# Patient Record
Sex: FEMALE | Race: BLACK OR AFRICAN AMERICAN | NOT HISPANIC OR LATINO | Employment: OTHER | URBAN - METROPOLITAN AREA
[De-identification: names, ages, dates, MRNs, and addresses within clinical notes are randomized per-mention and may not be internally consistent; named-entity substitution may affect disease eponyms.]

---

## 2023-03-23 DIAGNOSIS — E11.9 TYPE 2 DIABETES MELLITUS WITHOUT COMPLICATION, WITHOUT LONG-TERM CURRENT USE OF INSULIN (MULTI): Primary | ICD-10-CM

## 2023-03-23 RX ORDER — METFORMIN HYDROCHLORIDE 500 MG/1
TABLET ORAL
Qty: 360 TABLET | Refills: 3 | Status: SHIPPED | OUTPATIENT
Start: 2023-03-23 | End: 2024-03-04

## 2023-03-25 DIAGNOSIS — E11.9 TYPE 2 DIABETES MELLITUS WITHOUT COMPLICATIONS (MULTI): ICD-10-CM

## 2023-03-27 RX ORDER — GLIMEPIRIDE 4 MG/1
TABLET ORAL
Qty: 30 TABLET | Refills: 1 | Status: SHIPPED | OUTPATIENT
Start: 2023-03-27 | End: 2023-04-19 | Stop reason: SDUPTHER

## 2023-04-14 DIAGNOSIS — E11.9 TYPE 2 DIABETES MELLITUS WITHOUT COMPLICATION, WITHOUT LONG-TERM CURRENT USE OF INSULIN (MULTI): Primary | ICD-10-CM

## 2023-04-17 RX ORDER — BLOOD SUGAR DIAGNOSTIC
STRIP MISCELLANEOUS
Qty: 150 STRIP | Refills: 0 | Status: SHIPPED | OUTPATIENT
Start: 2023-04-17 | End: 2023-04-19 | Stop reason: SDUPTHER

## 2023-04-19 ENCOUNTER — OFFICE VISIT (OUTPATIENT)
Dept: PRIMARY CARE | Facility: CLINIC | Age: 63
End: 2023-04-19
Payer: COMMERCIAL

## 2023-04-19 VITALS
WEIGHT: 168.8 LBS | HEART RATE: 82 BPM | RESPIRATION RATE: 16 BRPM | BODY MASS INDEX: 25.58 KG/M2 | OXYGEN SATURATION: 97 % | DIASTOLIC BLOOD PRESSURE: 82 MMHG | HEIGHT: 68 IN | SYSTOLIC BLOOD PRESSURE: 130 MMHG

## 2023-04-19 DIAGNOSIS — E78.5 HYPERLIPIDEMIA, UNSPECIFIED HYPERLIPIDEMIA TYPE: ICD-10-CM

## 2023-04-19 DIAGNOSIS — E11.9 TYPE 2 DIABETES MELLITUS WITHOUT COMPLICATION, WITHOUT LONG-TERM CURRENT USE OF INSULIN (MULTI): ICD-10-CM

## 2023-04-19 DIAGNOSIS — Z86.39 HISTORY OF GRAVES' DISEASE: ICD-10-CM

## 2023-04-19 DIAGNOSIS — E11.9 TYPE 2 DIABETES MELLITUS WITHOUT COMPLICATIONS (MULTI): ICD-10-CM

## 2023-04-19 DIAGNOSIS — I10 PRIMARY HYPERTENSION: Primary | ICD-10-CM

## 2023-04-19 DIAGNOSIS — K51.80 OTHER ULCERATIVE COLITIS WITHOUT COMPLICATION (MULTI): ICD-10-CM

## 2023-04-19 PROBLEM — R14.0 ABDOMINAL BLOATING: Status: ACTIVE | Noted: 2023-04-19

## 2023-04-19 PROBLEM — H25.10 AGE-RELATED NUCLEAR CATARACT: Status: ACTIVE | Noted: 2023-04-19

## 2023-04-19 PROBLEM — H40.9 GLAUCOMA: Status: ACTIVE | Noted: 2023-04-19

## 2023-04-19 PROBLEM — N64.4 BREAST PAIN, RIGHT: Status: ACTIVE | Noted: 2023-04-19

## 2023-04-19 PROBLEM — K21.9 GERD WITHOUT ESOPHAGITIS: Status: ACTIVE | Noted: 2023-04-19

## 2023-04-19 PROBLEM — L91.8 SKIN TAGS, MULTIPLE ACQUIRED: Status: ACTIVE | Noted: 2023-04-19

## 2023-04-19 PROBLEM — K51.90 ULCERATIVE COLITIS (MULTI): Status: ACTIVE | Noted: 2023-04-19

## 2023-04-19 LAB
ESTIMATED AVERAGE GLUCOSE FOR HBA1C: 163 MG/DL
HEMOGLOBIN A1C/HEMOGLOBIN TOTAL IN BLOOD: 7.3 %

## 2023-04-19 PROCEDURE — 3051F HG A1C>EQUAL 7.0%<8.0%: CPT | Performed by: SPECIALIST

## 2023-04-19 PROCEDURE — 3075F SYST BP GE 130 - 139MM HG: CPT | Performed by: SPECIALIST

## 2023-04-19 PROCEDURE — 83036 HEMOGLOBIN GLYCOSYLATED A1C: CPT

## 2023-04-19 PROCEDURE — 99214 OFFICE O/P EST MOD 30 MIN: CPT | Performed by: SPECIALIST

## 2023-04-19 PROCEDURE — 1036F TOBACCO NON-USER: CPT | Performed by: SPECIALIST

## 2023-04-19 PROCEDURE — 36415 COLL VENOUS BLD VENIPUNCTURE: CPT | Performed by: SPECIALIST

## 2023-04-19 PROCEDURE — 3079F DIAST BP 80-89 MM HG: CPT | Performed by: SPECIALIST

## 2023-04-19 PROCEDURE — 82043 UR ALBUMIN QUANTITATIVE: CPT

## 2023-04-19 PROCEDURE — 82570 ASSAY OF URINE CREATININE: CPT

## 2023-04-19 PROCEDURE — 4010F ACE/ARB THERAPY RXD/TAKEN: CPT | Performed by: SPECIALIST

## 2023-04-19 PROCEDURE — 80053 COMPREHEN METABOLIC PANEL: CPT

## 2023-04-19 RX ORDER — LINAGLIPTIN 5 MG/1
5 TABLET, FILM COATED ORAL DAILY
Qty: 90 TABLET | Refills: 1 | Status: SHIPPED | OUTPATIENT
Start: 2023-04-19 | End: 2023-10-06

## 2023-04-19 RX ORDER — BLOOD SUGAR DIAGNOSTIC
STRIP MISCELLANEOUS
Qty: 150 STRIP | Refills: 0 | Status: SHIPPED | OUTPATIENT
Start: 2023-04-19 | End: 2024-01-12 | Stop reason: WASHOUT

## 2023-04-19 RX ORDER — LINAGLIPTIN 5 MG/1
TABLET, FILM COATED ORAL
COMMUNITY
Start: 2022-11-30 | End: 2023-04-19 | Stop reason: SDUPTHER

## 2023-04-19 RX ORDER — ATORVASTATIN CALCIUM 40 MG/1
1 TABLET, FILM COATED ORAL DAILY
COMMUNITY
Start: 2021-02-10 | End: 2023-04-19 | Stop reason: SDUPTHER

## 2023-04-19 RX ORDER — ATORVASTATIN CALCIUM 40 MG/1
40 TABLET, FILM COATED ORAL DAILY
Qty: 90 TABLET | Refills: 1 | Status: SHIPPED | OUTPATIENT
Start: 2023-04-19 | End: 2024-04-03

## 2023-04-19 RX ORDER — GLIMEPIRIDE 4 MG/1
TABLET ORAL
Qty: 90 TABLET | Refills: 1 | Status: SHIPPED | OUTPATIENT
Start: 2023-04-19 | End: 2024-03-12

## 2023-04-19 RX ORDER — LANCETS 33 GAUGE
1 EACH MISCELLANEOUS 2 TIMES DAILY
Qty: 100 EACH | Refills: 1 | Status: SHIPPED | OUTPATIENT
Start: 2023-04-19 | End: 2023-06-20

## 2023-04-19 RX ORDER — LISINOPRIL 20 MG/1
20 TABLET ORAL DAILY
Qty: 90 TABLET | Refills: 0 | Status: SHIPPED | OUTPATIENT
Start: 2023-04-19 | End: 2023-06-20

## 2023-04-19 RX ORDER — LANCETS 33 GAUGE
EACH MISCELLANEOUS
COMMUNITY
Start: 2023-01-11 | End: 2023-04-19 | Stop reason: SDUPTHER

## 2023-04-19 RX ORDER — LISINOPRIL 20 MG/1
1 TABLET ORAL DAILY
COMMUNITY
Start: 2020-03-18 | End: 2023-04-19 | Stop reason: SDUPTHER

## 2023-04-19 RX ORDER — BRIMONIDINE TARTRATE 2 MG/ML
1 SOLUTION/ DROPS OPHTHALMIC EVERY 12 HOURS
COMMUNITY

## 2023-04-19 RX ORDER — OMEPRAZOLE 40 MG/1
1 CAPSULE, DELAYED RELEASE ORAL DAILY
COMMUNITY
Start: 2022-09-14

## 2023-04-19 RX ORDER — TRAVOPROST OPHTHALMIC SOLUTION 0.04 MG/ML
1 SOLUTION OPHTHALMIC NIGHTLY
COMMUNITY

## 2023-04-19 ASSESSMENT — ENCOUNTER SYMPTOMS
LOSS OF SENSATION IN FEET: 0
OCCASIONAL FEELINGS OF UNSTEADINESS: 0
DEPRESSION: 0

## 2023-04-19 NOTE — ASSESSMENT & PLAN NOTE
-sees optho regularly  -Labs ordered HBA1C CMP Urine albumin  -Last A1C was 8.5 (was 8.5) and Trandjenta was added, stillalso on metformin 1000 mg bid and glimepiride 4 mg daily  -On ACEI

## 2023-04-20 LAB
ALANINE AMINOTRANSFERASE (SGPT) (U/L) IN SER/PLAS: 22 U/L (ref 7–45)
ALBUMIN (G/DL) IN SER/PLAS: 4.5 G/DL (ref 3.4–5)
ALBUMIN (MG/L) IN URINE: <7 MG/L
ALBUMIN/CREATININE (UG/MG) IN URINE: NORMAL UG/MG CRT (ref 0–30)
ALKALINE PHOSPHATASE (U/L) IN SER/PLAS: 77 U/L (ref 33–136)
ANION GAP IN SER/PLAS: 13 MMOL/L (ref 10–20)
ASPARTATE AMINOTRANSFERASE (SGOT) (U/L) IN SER/PLAS: 25 U/L (ref 9–39)
BILIRUBIN TOTAL (MG/DL) IN SER/PLAS: 0.6 MG/DL (ref 0–1.2)
CALCIUM (MG/DL) IN SER/PLAS: 10 MG/DL (ref 8.6–10.6)
CARBON DIOXIDE, TOTAL (MMOL/L) IN SER/PLAS: 28 MMOL/L (ref 21–32)
CHLORIDE (MMOL/L) IN SER/PLAS: 105 MMOL/L (ref 98–107)
CREATININE (MG/DL) IN SER/PLAS: 0.8 MG/DL (ref 0.5–1.05)
CREATININE (MG/DL) IN URINE: 90.9 MG/DL (ref 20–320)
GFR FEMALE: 82 ML/MIN/1.73M2
GLUCOSE (MG/DL) IN SER/PLAS: 89 MG/DL (ref 74–99)
POTASSIUM (MMOL/L) IN SER/PLAS: 4 MMOL/L (ref 3.5–5.3)
PROTEIN TOTAL: 6.9 G/DL (ref 6.4–8.2)
SODIUM (MMOL/L) IN SER/PLAS: 142 MMOL/L (ref 136–145)
UREA NITROGEN (MG/DL) IN SER/PLAS: 12 MG/DL (ref 6–23)

## 2023-04-25 ENCOUNTER — TELEPHONE (OUTPATIENT)
Dept: PRIMARY CARE | Facility: CLINIC | Age: 63
End: 2023-04-25
Payer: COMMERCIAL

## 2023-04-25 DIAGNOSIS — E11.9 TYPE 2 DIABETES MELLITUS WITHOUT COMPLICATION, WITHOUT LONG-TERM CURRENT USE OF INSULIN (MULTI): Primary | ICD-10-CM

## 2023-04-25 RX ORDER — GLIMEPIRIDE 1 MG/1
1 TABLET ORAL
Qty: 90 TABLET | Refills: 0 | Status: SHIPPED | OUTPATIENT
Start: 2023-04-25 | End: 2023-07-13 | Stop reason: SINTOL

## 2023-04-25 RX ORDER — GLIMEPIRIDE 1 MG/1
1 TABLET ORAL
Qty: 90 TABLET | Refills: 0 | Status: SHIPPED | OUTPATIENT
Start: 2023-04-25 | End: 2023-04-25 | Stop reason: SDUPTHER

## 2023-04-27 ENCOUNTER — TELEMEDICINE (OUTPATIENT)
Dept: PHARMACY | Facility: HOSPITAL | Age: 63
End: 2023-04-27
Payer: COMMERCIAL

## 2023-04-27 DIAGNOSIS — E11.9 TYPE 2 DIABETES MELLITUS WITHOUT COMPLICATION, WITHOUT LONG-TERM CURRENT USE OF INSULIN (MULTI): ICD-10-CM

## 2023-04-27 RX ORDER — BLOOD-GLUCOSE SENSOR
EACH MISCELLANEOUS
Qty: 2 EACH | Refills: 11 | Status: SHIPPED | OUTPATIENT
Start: 2023-04-27 | End: 2023-07-13 | Stop reason: SDUPTHER

## 2023-04-27 NOTE — PROGRESS NOTES
Subjective     Patient ID: Trevon Vyas is a 63 y.o. female who presents for Diabetes.    Patient referred for CGM- will complete DM review at follow-up.     Allergies   Allergen Reactions    Sulfur Rash       Objective       Lab Review  Lab Results   Component Value Date    BILITOT 0.6 04/19/2023    CALCIUM 10.0 04/19/2023    CO2 28 04/19/2023     04/19/2023    CREATININE 0.80 04/19/2023    GLUCOSE 89 04/19/2023    ALKPHOS 77 04/19/2023    K 4.0 04/19/2023    PROT 6.9 04/19/2023     04/19/2023    AST 25 04/19/2023    ALT 22 04/19/2023    BUN 12 04/19/2023    ANIONGAP 13 04/19/2023    ALBUMIN 4.5 04/19/2023    GFRF 82 04/19/2023     Lab Results   Component Value Date    TRIG 130 10/14/2022    CHOL 119 10/14/2022    HDL 35.2 (A) 10/14/2022     Lab Results   Component Value Date    HGBA1C 7.3 (A) 04/19/2023     The ASCVD Risk score (Sanjiv MOSHER, et al., 2019) failed to calculate for the following reasons:    The valid total cholesterol range is 130 to 320 mg/dL      Assessment/Plan     Problem List Items Addressed This Visit          Endocrine/Metabolic    Diabetes mellitus (CMS/HCC)     Patient has compatible phone for Freestyle Carlita 3. ~$40/month through her insurance. Will send scripts and complete DM assessment at follow-up visit.               Type 2 diabetes mellitus, is not at goal. Goal ~6.5%    Follow up: I recommend diabetes care be 1 weeks.    Tsering He PharmD [facility]  Clinical Pharmacist

## 2023-04-27 NOTE — ASSESSMENT & PLAN NOTE
Patient has compatible phone for Freestyle Carlita 3. ~$40/month through her insurance. Will send scripts and complete DM assessment at follow-up visit.

## 2023-05-18 ENCOUNTER — TELEMEDICINE (OUTPATIENT)
Dept: PHARMACY | Facility: HOSPITAL | Age: 63
End: 2023-05-18
Payer: COMMERCIAL

## 2023-05-18 DIAGNOSIS — E11.9 TYPE 2 DIABETES MELLITUS WITHOUT COMPLICATION, WITHOUT LONG-TERM CURRENT USE OF INSULIN (MULTI): ICD-10-CM

## 2023-05-18 RX ORDER — ASPIRIN 81 MG/1
81 TABLET ORAL DAILY
COMMUNITY

## 2023-05-18 RX ORDER — MESALAMINE 1.2 G/1
4 TABLET, DELAYED RELEASE ORAL
COMMUNITY
End: 2023-11-16

## 2023-05-18 RX ORDER — LANOLIN ALCOHOL/MO/W.PET/CERES
100 CREAM (GRAM) TOPICAL DAILY
COMMUNITY

## 2023-05-18 NOTE — PROGRESS NOTES
Subjective     Patient ID: Trevon Vyas is a 63 y.o. female who presents for Diabetes.    Diabetes  She presents for her initial diabetic visit. She has type 2 diabetes mellitus. Her disease course has been improving. She is compliant with treatment all of the time. She is following a generally unhealthy and diabetic diet. She participates in exercise intermittently. Eye exam is current.     Patient reports since starting additional tablet of glimepiride, has been going low (69 mg/dL) after lunch and high after dinner.     Allergies   Allergen Reactions    Sulfur Rash       Objective     Current DM Pharmacotherapy:   Metformin 500 mg tablet - 2 tablets with lunch and 2 tablets with dinner   Glimepiride 4 mg - 1 tablet with lunch  Glimepiride 1 mg - 1 tablet with lunch   Tradjenta 5 mg - 1 tablet with dinner     SECONDARY PREVENTION  - Statin? Yes  - ACE-I/ARB? Yes  - Aspirin? Yes    Current monitoring regimen:   Patient is using: continuous glucose monitor    SMBG Readings: 7- day average: 148 mg/dL    Any episodes of hypoglycemia? Yes  Hypoglycemia awareness? Yes    There were no vitals taken for this visit.    Pertinent PMH Review:  - PMH of Pancreatitis: No  - PMH/FH of Medullary Thyroid Cancer: No  - PMH of Retinopathy: Glaucoma  - PMH of Urinary Tract Infections: No    Lab Review  Lab Results   Component Value Date    BILITOT 0.6 04/19/2023    CALCIUM 10.0 04/19/2023    CO2 28 04/19/2023     04/19/2023    CREATININE 0.80 04/19/2023    GLUCOSE 89 04/19/2023    ALKPHOS 77 04/19/2023    K 4.0 04/19/2023    PROT 6.9 04/19/2023     04/19/2023    AST 25 04/19/2023    ALT 22 04/19/2023    BUN 12 04/19/2023    ANIONGAP 13 04/19/2023    ALBUMIN 4.5 04/19/2023    GFRF 82 04/19/2023     Lab Results   Component Value Date    TRIG 130 10/14/2022    CHOL 119 10/14/2022    HDL 35.2 (A) 10/14/2022     Lab Results   Component Value Date    HGBA1C 7.3 (A) 04/19/2023     The ASCVD Risk score (Sanjiv DK, et al., 2019)  failed to calculate for the following reasons:    The valid total cholesterol range is 130 to 320 mg/dL      Assessment/Plan     Problem List Items Addressed This Visit          Endocrine/Metabolic    Diabetes mellitus (CMS/[facility])     CHANGE the way you take your Glimepiride:   Take 4 mg with lunch and 1 mg with dinner   CONTINUE:   Metformin 500 mg - 4 tablets daily   Tradjenta 5 mg - 1 tablet daily             Type 2 diabetes mellitus, is not at goal. A1C elevated, goal 6.5-6.9%    Follow up: I recommend diabetes care be 4 weeks.    Tsering He PharmD [facility]  Clinical Pharmacist     Continue all meds under the continuation of care with the referring provider and clinical pharmacy team

## 2023-05-18 NOTE — ASSESSMENT & PLAN NOTE
CHANGE the way you take your Glimepiride:   Take 4 mg with lunch and 1 mg with dinner   CONTINUE:   Metformin 500 mg - 4 tablets daily   Tradjenta 5 mg - 1 tablet daily

## 2023-06-15 ENCOUNTER — TELEMEDICINE (OUTPATIENT)
Dept: PHARMACY | Facility: HOSPITAL | Age: 63
End: 2023-06-15
Payer: COMMERCIAL

## 2023-06-15 DIAGNOSIS — E11.9 TYPE 2 DIABETES MELLITUS WITHOUT COMPLICATION, WITHOUT LONG-TERM CURRENT USE OF INSULIN (MULTI): Primary | ICD-10-CM

## 2023-06-15 ASSESSMENT — ENCOUNTER SYMPTOMS
TREMORS: 1
HUNGER: 1
DIABETIC ASSOCIATED SYMPTOMS: 0
SWEATS: 1

## 2023-06-15 NOTE — PROGRESS NOTES
[facility] Pharmacy Consult  Trevon Vyas is a 63 y.o. female was referred to Clinical Pharmacy Team for a Pharmacy consult.  The patient was referred for their Diabetes.    Referring Provider: Brianna Woods DO    Subjective   Allergies   Allergen Reactions    Sulfur Rash       [facility] ([facility] ) - [place], [place] - [address]  [address]  [address]  [facility] [address]  Phone: [phone] Fax: [phone]    [id number] - [place], [place] - [address] AT [facility]  [address]  [place] [address]  Phone: [phone] Fax: [phone]      Diabetes  She has type 2 diabetes mellitus. Her disease course has been improving. Hypoglycemia symptoms include hunger, sweats and tremors. There are no diabetic associated symptoms. Symptoms are stable. Risk factors for coronary artery disease include diabetes mellitus, dyslipidemia and hypertension. Current diabetic treatment includes oral agent (triple therapy). She is compliant with treatment all of the time. Her home blood glucose trend is decreasing steadily.     Pt reported the following CGM data:  CGM: Freestyle onelia 3,      mg/dL range    30-day data:  Time above range: 34%  Time within range: 65%  Time under range: 1%    14-day data:  Time above range: 38%  Time within range: 62%  Time under range: 0%    Average: 168mg/dL    Pt reported 2 episodes of hypoglycemia in the last month. Reported readings of 69 mg/dL and 53 mg/dL, respectively. Pt reported this started with the addition of her 1mg glimeperide tablet. Before starting the additional 1mg, she did not have any hypoglycemia episodes. Went through some cost with the patient, Jardiance $57 monthly and Rybelsus $90 monthly per insurance. Actos $8 monthly. Pt willing to give [facility] PAP a try and see if she qualify. Pt unwilling to try injections.    Review of Systems   Neurological:  Positive for tremors.       Objective     There were no  vitals taken for this visit.     LAB  Lab Results   Component Value Date    BILITOT 0.6 04/19/2023    CALCIUM 10.0 04/19/2023    CO2 28 04/19/2023     04/19/2023    CREATININE 0.80 04/19/2023    GLUCOSE 89 04/19/2023    ALKPHOS 77 04/19/2023    K 4.0 04/19/2023    PROT 6.9 04/19/2023     04/19/2023    AST 25 04/19/2023    ALT 22 04/19/2023    BUN 12 04/19/2023    ANIONGAP 13 04/19/2023    ALBUMIN 4.5 04/19/2023    GFRF 82 04/19/2023     Lab Results   Component Value Date    TRIG 130 10/14/2022    CHOL 119 10/14/2022    HDL 35.2 (A) 10/14/2022     Lab Results   Component Value Date    HGBA1C 7.3 (A) 04/19/2023       Current Outpatient Medications on File Prior to Visit   Medication Sig Dispense Refill    aspirin 81 mg EC tablet Take 1 tablet (81 mg) by mouth once daily.      atorvastatin (Lipitor) 40 mg tablet Take 1 tablet (40 mg) by mouth once daily. 90 tablet 1    blood sugar diagnostic (OneTouch Ultra Test) strip TEST TWICE DAILY 150 strip 0    blood-glucose sensor (cfgAdvanceStyle Carlita 3 Sensor) device Use 1 sensor every 14 days to test blood glucose levels. 2 each 11    brimonidine (AlphaGAN P) 0.2 % ophthalmic solution Administer 1 drop into both eyes in the morning and 1 drop in the evening.      cholecalciferol, vitD3,/vit K2 (VITAMIN D3-VITAMIN K2 ORAL) Take 1 tablet by mouth once daily. 4000 units of Vitamin D3      cyanocobalamin (Vitamin B-12) 1,000 mcg tablet Take 100 mcg by mouth once daily.      glimepiride (AmaryL) 1 mg tablet Take 1 tablet (1 mg) by mouth once daily in the morning. Take before meals. (To be taken along with glimepiride 4 mg tablet for total daily dose of 5 mg) 90 tablet 0    glimepiride (Amaryl) 4 mg tablet TAKE ONE TABLET BY MOUTH ONCE DAILY WITH BREAKFAST 90 tablet 1    lisinopril 20 mg tablet Take 1 tablet (20 mg) by mouth once daily. 90 tablet 0    mesalamine (Lialda) 1.2 gram EC tablet Take 4 tablets (4.8 g) by mouth once daily in the evening. Take with meals. Do not  crush, chew, or split.      metFORMIN (Glucophage) 500 mg tablet TAKE 2 TABLETS BY MOUTH  TWICE DAILY WITH MEALS 360 tablet 3    omega 3-dha-epa-fish-turmeric 417 mg-120 mg- 276 mg-600 mg capsule Take 1 capsule by mouth once daily.      omeprazole (PriLOSEC) 40 mg DR capsule Take 1 capsule (40 mg) by mouth once daily.      OneTouch Delica Plus Lancet 33 gauge misc 1 each  in the morning and 1 each before bedtime. To test blood glucose. 100 each 1    Tradjenta 5 mg tablet Take 1 tablet (5 mg) by mouth once daily. 90 tablet 1    travoprost (Travatan Z) 0.004 % drops ophthalmic solution Administer 1 drop into both eyes once daily at bedtime.       No current facility-administered medications on file prior to visit.        SECONDARY PREVENTION  - Statin? yes  - ACE-I/ARB? yes    Assessment/Plan   Problem List Items Addressed This Visit       Diabetes mellitus (CMS/HCC) - Primary     Stop Tradjenta upon starting Rybelsus.   Stop Glimepiride 1mg as patient is still having hypoglycemia episodes  Start Rybelsus 3mg PO every day when  PAP is approved. Pt is almost at goal with last A1c of 7.3%. Replacement of GLP-1 vs DPP4 can lower A1c to help get her to goal.   Prescription sent to pt's preferred  pharmacy for the following to get  PAP approve:  Rybelsus 3mg PO daily  FUV in 4 weeks to assess glucose and tolerance to new start.           Continue all meds under the continuation of care with the referring provider and clinical pharmacy team.    Bryson Benoit PharmD     Verbal consent to manage patient's drug therapy was obtained from [the patient and/or an individual authorized to act on behalf of a patient]. They were informed they may decline to participate or withdraw from participation in pharmacy services at any time.

## 2023-06-15 NOTE — ASSESSMENT & PLAN NOTE
Stop Tradjenta upon starting Rybelsus.   Stop Glimepiride 1mg as patient is still having hypoglycemia episodes  Start Rybelsus 3mg PO every day when  PAP is approved. Pt is almost at goal with last A1c of 7.3%. Replacement of GLP-1 vs DPP4 can lower A1c to help get her to goal.   Prescription sent to pt's preferred  pharmacy for the following to get  PAP approve:  Rybelsus 3mg PO daily  FUV in 4 weeks to assess glucose and tolerance to new start.

## 2023-06-16 NOTE — PROGRESS NOTES
I reviewed the progress note and agree with the resident’s findings and plans as written. Case discussed with resident.    Tsering He, BobbyD

## 2023-06-18 DIAGNOSIS — E11.9 TYPE 2 DIABETES MELLITUS WITHOUT COMPLICATION, WITHOUT LONG-TERM CURRENT USE OF INSULIN (MULTI): ICD-10-CM

## 2023-06-18 DIAGNOSIS — I10 PRIMARY HYPERTENSION: ICD-10-CM

## 2023-06-20 RX ORDER — LANCETS 33 GAUGE
EACH MISCELLANEOUS
Qty: 200 EACH | Refills: 2 | Status: SHIPPED | OUTPATIENT
Start: 2023-06-20 | End: 2024-01-12 | Stop reason: WASHOUT

## 2023-06-20 RX ORDER — LISINOPRIL 20 MG/1
20 TABLET ORAL DAILY
Qty: 90 TABLET | Refills: 2 | Status: SHIPPED | OUTPATIENT
Start: 2023-06-20 | End: 2024-05-29

## 2023-07-13 ENCOUNTER — TELEMEDICINE (OUTPATIENT)
Dept: PHARMACY | Facility: HOSPITAL | Age: 63
End: 2023-07-13
Payer: COMMERCIAL

## 2023-07-13 DIAGNOSIS — E11.9 TYPE 2 DIABETES MELLITUS WITHOUT COMPLICATION, WITHOUT LONG-TERM CURRENT USE OF INSULIN (MULTI): ICD-10-CM

## 2023-07-13 RX ORDER — BLOOD-GLUCOSE SENSOR
EACH MISCELLANEOUS
Qty: 6 EACH | Refills: 4 | Status: SHIPPED | OUTPATIENT
Start: 2023-07-13

## 2023-07-13 NOTE — PROGRESS NOTES
Subjective     Patient ID: Trevon Vyas is a 63 y.o. female who presents for No chief complaint on file..    Diabetes  She presents for her initial diabetic visit. She has type 2 diabetes mellitus. Her disease course has been improving. She is compliant with treatment all of the time. She is following a generally unhealthy and diabetic diet. She participates in exercise intermittently. Eye exam is current.         Allergies   Allergen Reactions    Sulfur Rash       Objective     Current DM Pharmacotherapy:   Metformin 500 mg tablet - 2 tablets with lunch and 2 tablets with dinner   Glimepiride 4 mg - 1 tablet with lunch  Tradjenta 5 mg - 1 tablet with dinner     SECONDARY PREVENTION  - Statin? Yes  - ACE-I/ARB? Yes  - Aspirin? Yes    Current monitoring regimen:   Patient is using: continuous glucose monitor    SMBG Readings: 14-day time within range: 70%, Time 180-250 mg/dL 26%    Any episodes of hypoglycemia? Yes  Hypoglycemia awareness? Yes    There were no vitals taken for this visit.    Pertinent PMH Review:  - PMH of Pancreatitis: No  - PMH/FH of Medullary Thyroid Cancer: No  - PMH of Retinopathy: Glaucoma  - PMH of Urinary Tract Infections: No    Lab Review  Lab Results   Component Value Date    BILITOT 0.6 04/19/2023    CALCIUM 10.0 04/19/2023    CO2 28 04/19/2023     04/19/2023    CREATININE 0.80 04/19/2023    GLUCOSE 89 04/19/2023    ALKPHOS 77 04/19/2023    K 4.0 04/19/2023    PROT 6.9 04/19/2023     04/19/2023    AST 25 04/19/2023    ALT 22 04/19/2023    BUN 12 04/19/2023    ANIONGAP 13 04/19/2023    ALBUMIN 4.5 04/19/2023    GFRF 82 04/19/2023     Lab Results   Component Value Date    TRIG 130 10/14/2022    CHOL 119 10/14/2022    HDL 35.2 (A) 10/14/2022     Lab Results   Component Value Date    HGBA1C 7.3 (A) 04/19/2023     The ASCVD Risk score (Sanjiv MOSHER, et al., 2019) failed to calculate for the following reasons:    The valid total cholesterol range is 130 to 320  mg/dL      Assessment/Plan     Problem List Items Addressed This Visit       Diabetes mellitus (CMS/[facility])     Time in range improved. Only 1 low. Patient prefers to focus on lifestyle changes at this time over adding an additional medication.     Metformin 500 mg tablet - 2 tablets with lunch and 2 tablets with dinner   Glimepiride 4 mg - 1 tablet with lunch  Tradjenta 5 mg - 1 tablet with dinner          Relevant Medications    blood-glucose sensor (FreeStyle Carlita 3 Sensor) device     *Per patient does not meet income requirements for [facility] program    Type 2 diabetes mellitus, is not at goal. A1C elevated, goal <7%    Follow up: I recommend diabetes care be 4 weeks.    Tsering He PharmD [facility]  Clinical Pharmacist     Continue all meds under the continuation of care with the referring provider and clinical pharmacy team

## 2023-07-13 NOTE — ASSESSMENT & PLAN NOTE
Time in range improved. Only 1 low. Patient prefers to focus on lifestyle changes at this time over adding an additional medication.     Metformin 500 mg tablet - 2 tablets with lunch and 2 tablets with dinner   Glimepiride 4 mg - 1 tablet with lunch  Tradjenta 5 mg - 1 tablet with dinner

## 2023-07-26 DIAGNOSIS — E11.9 TYPE 2 DIABETES MELLITUS WITHOUT COMPLICATIONS (MULTI): ICD-10-CM

## 2023-07-26 RX ORDER — GLIMEPIRIDE 1 MG/1
TABLET ORAL
OUTPATIENT
Start: 2023-07-26

## 2023-07-26 NOTE — TELEPHONE ENCOUNTER
[facility] diabetic [facility]  I reached out to the patient she says she need a rx for Glimepiride 1 mg say she  need this 1 mg.  Patient picked up the 4 mg tablets.

## 2023-08-10 ENCOUNTER — TELEMEDICINE (OUTPATIENT)
Dept: PHARMACY | Facility: HOSPITAL | Age: 63
End: 2023-08-10
Payer: COMMERCIAL

## 2023-08-10 DIAGNOSIS — E11.9 TYPE 2 DIABETES MELLITUS WITHOUT COMPLICATION, WITHOUT LONG-TERM CURRENT USE OF INSULIN (MULTI): ICD-10-CM

## 2023-08-10 NOTE — PROGRESS NOTES
Subjective     Patient ID: Trevon Vyas is a 63 y.o. female who presents for No chief complaint on file..    Diabetes  She presents for her initial diabetic visit. She has type 2 diabetes mellitus. Her disease course has been improving. She is compliant with treatment all of the time. She is following a generally unhealthy and diabetic diet. She participates in exercise intermittently. Eye exam is current.     Continuing to work on diet and exercise. Recently back from vacation. Has had 2 highs and 1 low in the past week that she saw on CGM.       Allergies   Allergen Reactions    Sulfur Rash       Objective     Current DM Pharmacotherapy:   Metformin 500 mg tablet - 2 tablets with lunch and 2 tablets with dinner   Glimepiride 4 mg - 1 tablet with lunch  Tradjenta 5 mg - 1 tablet with dinner     SECONDARY PREVENTION  - Statin? Yes  - ACE-I/ARB? Yes  - Aspirin? Yes    Current monitoring regimen:   Patient is using: continuous glucose monitor - 3 times daily morning, before lunch, before dinner    SMBG Readings: 7-day time within range: 54%     Any episodes of hypoglycemia? Yes  Hypoglycemia awareness? Yes    There were no vitals taken for this visit.    Pertinent PMH Review:  - PMH of Pancreatitis: No  - PMH/FH of Medullary Thyroid Cancer: No  - PMH of Retinopathy: Glaucoma  - PMH of Urinary Tract Infections: No    Lab Review  Lab Results   Component Value Date    BILITOT 0.6 04/19/2023    CALCIUM 10.0 04/19/2023    CO2 28 04/19/2023     04/19/2023    CREATININE 0.80 04/19/2023    GLUCOSE 89 04/19/2023    ALKPHOS 77 04/19/2023    K 4.0 04/19/2023    PROT 6.9 04/19/2023     04/19/2023    AST 25 04/19/2023    ALT 22 04/19/2023    BUN 12 04/19/2023    ANIONGAP 13 04/19/2023    ALBUMIN 4.5 04/19/2023    GFRF 82 04/19/2023     Lab Results   Component Value Date    TRIG 130 10/14/2022    CHOL 119 10/14/2022    HDL 35.2 (A) 10/14/2022     Lab Results   Component Value Date    HGBA1C 7.3 (A) 04/19/2023     The  ASCVD Risk score (Sanjiv MOSHER, et al., 2019) failed to calculate for the following reasons:    The valid total cholesterol range is 130 to 320 mg/dL      Assessment/Plan     Problem List Items Addressed This Visit       Diabetes mellitus (CMS/[facility])     *Per patient does not meet income requirements for [facility] program    Obtain updated A1C    Type 2 diabetes mellitus, is not at goal. A1C elevated, goal <7%    Follow up: I recommend diabetes care be 4 weeks.    Tsering He PharmD [facility]  Clinical Pharmacist     Continue all meds under the continuation of care with the referring provider and clinical pharmacy team

## 2023-08-10 NOTE — ASSESSMENT & PLAN NOTE
CONTINUE:   Metformin 500 mg tablet - 2 tablets with lunch and 2 tablets with dinner   Glimepiride 4 mg - 1 tablet with lunch  Tradjenta 5 mg - 1 tablet with dinner

## 2023-08-22 ENCOUNTER — LAB (OUTPATIENT)
Dept: LAB | Facility: LAB | Age: 63
End: 2023-08-22
Payer: COMMERCIAL

## 2023-08-22 DIAGNOSIS — E11.9 TYPE 2 DIABETES MELLITUS WITHOUT COMPLICATION, WITHOUT LONG-TERM CURRENT USE OF INSULIN (MULTI): ICD-10-CM

## 2023-08-22 LAB
ESTIMATED AVERAGE GLUCOSE FOR HBA1C: 171 MG/DL
HEMOGLOBIN A1C/HEMOGLOBIN TOTAL IN BLOOD: 7.6 %

## 2023-08-22 PROCEDURE — 83036 HEMOGLOBIN GLYCOSYLATED A1C: CPT

## 2023-08-22 PROCEDURE — 36415 COLL VENOUS BLD VENIPUNCTURE: CPT

## 2023-08-24 ENCOUNTER — TELEMEDICINE (OUTPATIENT)
Dept: PHARMACY | Facility: HOSPITAL | Age: 63
End: 2023-08-24
Payer: COMMERCIAL

## 2023-08-24 DIAGNOSIS — E11.9 TYPE 2 DIABETES MELLITUS WITHOUT COMPLICATION, WITHOUT LONG-TERM CURRENT USE OF INSULIN (MULTI): ICD-10-CM

## 2023-08-24 NOTE — PROGRESS NOTES
Subjective     Patient ID: Trevon Vyas is a 63 y.o. female who presents for Diabetes.    Diabetes  She presents for her initial diabetic visit. She has type 2 diabetes mellitus. Her disease course has been improving. She is compliant with treatment all of the time. She is following a generally unhealthy and diabetic diet. She participates in exercise intermittently. Eye exam is current.     Continuing to work on diet and exercise. Discussed need for strict diet control given poor insurance coverage of newer agents. Encouraged patient to utilize CGM to closely monitor dietary impact on sugars. Willing to meet with nutrition.       Allergies   Allergen Reactions    Sulfur Rash       Objective     Current DM Pharmacotherapy:   Metformin 500 mg tablet - 2 tablets with lunch and 2 tablets with dinner   Glimepiride 4 mg - 1 tablet with lunch  Tradjenta 5 mg - 1 tablet with dinner     SECONDARY PREVENTION  - Statin? Yes  - ACE-I/ARB? Yes  - Aspirin? Yes    Current monitoring regimen:   Patient is using: continuous glucose monitor - 3 times daily morning, before lunch, before dinner    SMBG Readings: Numbers have been fluctuating. BG as high as 300 but as low as 100's.     Any episodes of hypoglycemia? Yes  Hypoglycemia awareness? Yes    There were no vitals taken for this visit.    Pertinent PMH Review:  - PMH of Pancreatitis: No  - PMH/FH of Medullary Thyroid Cancer: No  - PMH of Retinopathy: Glaucoma  - PMH of Urinary Tract Infections: No    Lab Review  Lab Results   Component Value Date    BILITOT 0.6 04/19/2023    CALCIUM 10.0 04/19/2023    CO2 28 04/19/2023     04/19/2023    CREATININE 0.80 04/19/2023    GLUCOSE 89 04/19/2023    ALKPHOS 77 04/19/2023    K 4.0 04/19/2023    PROT 6.9 04/19/2023     04/19/2023    AST 25 04/19/2023    ALT 22 04/19/2023    BUN 12 04/19/2023    ANIONGAP 13 04/19/2023    ALBUMIN 4.5 04/19/2023    GFRF 82 04/19/2023     Lab Results   Component Value Date    TRIG 130 10/14/2022     CHOL 119 10/14/2022    HDL 35.2 (A) 10/14/2022     Lab Results   Component Value Date    HGBA1C 7.6 (A) 08/22/2023     The ASCVD Risk score (Sanjiv MOSHER, et al., 2019) failed to calculate for the following reasons:    The valid total cholesterol range is 130 to 320 mg/dL      Assessment/Plan     Problem List Items Addressed This Visit       Diabetes mellitus (CMS/[facility])     CONTINUE:   Metformin 500 mg tablet - 2 tablets with lunch and 2 tablets with dinner   Glimepiride 4 mg - 1 tablet with lunch  Tradjenta 5 mg - 1 tablet with dinner           *Per patient does not meet income requirements for [facility] program    Type 2 diabetes mellitus, is not at goal. A1C elevated, goal <7%    Follow up: I recommend diabetes care be 4 weeks.    Tsering RosalesD [facility]  Clinical Pharmacist     Continue all meds under the continuation of care with the referring provider and clinical pharmacy team

## 2023-08-25 LAB
ALANINE AMINOTRANSFERASE (SGPT) (U/L) IN SER/PLAS: 16 U/L (ref 7–45)
ALBUMIN (G/DL) IN SER/PLAS: 4.3 G/DL (ref 3.4–5)
ALKALINE PHOSPHATASE (U/L) IN SER/PLAS: 82 U/L (ref 33–136)
ANION GAP IN SER/PLAS: 11 MMOL/L (ref 10–20)
ASPARTATE AMINOTRANSFERASE (SGOT) (U/L) IN SER/PLAS: 19 U/L (ref 9–39)
BASOPHILS (10*3/UL) IN BLOOD BY AUTOMATED COUNT: 0.02 X10E9/L (ref 0–0.1)
BASOPHILS/100 LEUKOCYTES IN BLOOD BY AUTOMATED COUNT: 0.4 % (ref 0–2)
BILIRUBIN TOTAL (MG/DL) IN SER/PLAS: 0.5 MG/DL (ref 0–1.2)
C REACTIVE PROTEIN (MG/L) IN SER/PLAS BY HIGH SENSIT: 0.8 MG/L
CALCIUM (MG/DL) IN SER/PLAS: 9.4 MG/DL (ref 8.6–10.6)
CARBON DIOXIDE, TOTAL (MMOL/L) IN SER/PLAS: 29 MMOL/L (ref 21–32)
CHLORIDE (MMOL/L) IN SER/PLAS: 106 MMOL/L (ref 98–107)
CREATININE (MG/DL) IN SER/PLAS: 0.83 MG/DL (ref 0.5–1.05)
EOSINOPHILS (10*3/UL) IN BLOOD BY AUTOMATED COUNT: 0.04 X10E9/L (ref 0–0.7)
EOSINOPHILS/100 LEUKOCYTES IN BLOOD BY AUTOMATED COUNT: 0.7 % (ref 0–6)
ERYTHROCYTE DISTRIBUTION WIDTH (RATIO) BY AUTOMATED COUNT: 13 % (ref 11.5–14.5)
ERYTHROCYTE MEAN CORPUSCULAR HEMOGLOBIN CONCENTRATION (G/DL) BY AUTOMATED: 32.1 G/DL (ref 32–36)
ERYTHROCYTE MEAN CORPUSCULAR VOLUME (FL) BY AUTOMATED COUNT: 84 FL (ref 80–100)
ERYTHROCYTES (10*6/UL) IN BLOOD BY AUTOMATED COUNT: 4.26 X10E12/L (ref 4–5.2)
GFR FEMALE: 79 ML/MIN/1.73M2
GLUCOSE (MG/DL) IN SER/PLAS: 185 MG/DL (ref 74–99)
HEMATOCRIT (%) IN BLOOD BY AUTOMATED COUNT: 35.8 % (ref 36–46)
HEMOGLOBIN (G/DL) IN BLOOD: 11.5 G/DL (ref 12–16)
IMMATURE GRANULOCYTES/100 LEUKOCYTES IN BLOOD BY AUTOMATED COUNT: 0.2 % (ref 0–0.9)
LEUKOCYTES (10*3/UL) IN BLOOD BY AUTOMATED COUNT: 5.5 X10E9/L (ref 4.4–11.3)
LYMPHOCYTES (10*3/UL) IN BLOOD BY AUTOMATED COUNT: 1.98 X10E9/L (ref 1.2–4.8)
LYMPHOCYTES/100 LEUKOCYTES IN BLOOD BY AUTOMATED COUNT: 36.1 % (ref 13–44)
MONOCYTES (10*3/UL) IN BLOOD BY AUTOMATED COUNT: 0.5 X10E9/L (ref 0.1–1)
MONOCYTES/100 LEUKOCYTES IN BLOOD BY AUTOMATED COUNT: 9.1 % (ref 2–10)
NEUTROPHILS (10*3/UL) IN BLOOD BY AUTOMATED COUNT: 2.94 X10E9/L (ref 1.2–7.7)
NEUTROPHILS/100 LEUKOCYTES IN BLOOD BY AUTOMATED COUNT: 53.5 % (ref 40–80)
NRBC (PER 100 WBCS) BY AUTOMATED COUNT: 0 /100 WBC (ref 0–0)
PLATELETS (10*3/UL) IN BLOOD AUTOMATED COUNT: 283 X10E9/L (ref 150–450)
POTASSIUM (MMOL/L) IN SER/PLAS: 4.1 MMOL/L (ref 3.5–5.3)
PROTEIN TOTAL: 6.6 G/DL (ref 6.4–8.2)
SODIUM (MMOL/L) IN SER/PLAS: 142 MMOL/L (ref 136–145)
UREA NITROGEN (MG/DL) IN SER/PLAS: 12 MG/DL (ref 6–23)

## 2023-09-01 LAB — CALPROTECTIN, STOOL: 79 UG/G

## 2023-09-21 ENCOUNTER — TELEMEDICINE (OUTPATIENT)
Dept: PHARMACY | Facility: HOSPITAL | Age: 63
End: 2023-09-21
Payer: COMMERCIAL

## 2023-09-21 DIAGNOSIS — E11.9 TYPE 2 DIABETES MELLITUS WITHOUT COMPLICATION, WITHOUT LONG-TERM CURRENT USE OF INSULIN (MULTI): ICD-10-CM

## 2023-09-21 NOTE — PROGRESS NOTES
Subjective     Patient ID: Trevon Vyas is a 63 y.o. female who presents for Diabetes.    Diabetes  She presents for her initial diabetic visit. She has type 2 diabetes mellitus. Her disease course has been improving. She is compliant with treatment all of the time. She is following a generally unhealthy and diabetic diet. She participates in exercise intermittently. Eye exam is current.     Now using Freestyle Carlita 3, numbers have improved significantly. No side effects or issues with low BG.       Allergies   Allergen Reactions    Sulfur Rash       Objective     Current DM Pharmacotherapy:   Metformin 500 mg tablet - 2 tablets with lunch and 2 tablets with dinner   Glimepiride 4 mg - 1 tablet with lunch  Tradjenta 5 mg - 1 tablet with dinner     SECONDARY PREVENTION  - Statin? Yes  - ACE-I/ARB? Yes  - Aspirin? Yes    Current monitoring regimen:   Patient is using: continuous glucose monitor - 3 times daily morning, before lunch, before dinner    SMBG Readings: Average: 143 mg/dL     Any episodes of hypoglycemia? Yes  Hypoglycemia awareness? Yes    There were no vitals taken for this visit.    Pertinent PMH Review:  - PMH of Pancreatitis: No  - PMH/FH of Medullary Thyroid Cancer: No  - PMH of Retinopathy: Glaucoma  - PMH of Urinary Tract Infections: No    Lab Review  Lab Results   Component Value Date    BILITOT 0.5 08/25/2023    CALCIUM 9.4 08/25/2023    CO2 29 08/25/2023     08/25/2023    CREATININE 0.83 08/25/2023    GLUCOSE 185 (H) 08/25/2023    ALKPHOS 82 08/25/2023    K 4.1 08/25/2023    PROT 6.6 08/25/2023     08/25/2023    AST 19 08/25/2023    ALT 16 08/25/2023    BUN 12 08/25/2023    ANIONGAP 11 08/25/2023    ALBUMIN 4.3 08/25/2023    GFRF 79 08/25/2023     Lab Results   Component Value Date    TRIG 130 10/14/2022    CHOL 119 10/14/2022    HDL 35.2 (A) 10/14/2022     Lab Results   Component Value Date    HGBA1C 7.6 (A) 08/22/2023     The ASCVD Risk score (Sanjiv MOSHER, et al., 2019) failed to  calculate for the following reasons:    The valid total cholesterol range is 130 to 320 mg/dL      Assessment/Plan     Problem List Items Addressed This Visit       Diabetes mellitus (CMS/[facility])     CONTINUE:   Metformin 500 mg tablet - 2 tablets with lunch and 2 tablets with dinner   Glimepiride 4 mg - 1 tablet with lunch  Tradjenta 5 mg - 1 tablet with dinner          *Per patient does not meet income requirements for [facility] program    Type 2 diabetes mellitus, is not at goal. A1C elevated, goal <7%    Follow up: I recommend diabetes care be 8 weeks.  New A1C due after 11/22/23    Tsering He PharmD [facility]  Clinical Pharmacist     Continue all meds under the continuation of care with the referring provider and clinical pharmacy team

## 2023-10-06 NOTE — PROGRESS NOTES
Subjective   Patient ID: Trevon Vyas is a 62 y.o. female who presents for Follow-up and Diabetes.  HPI    Turning 63 on Friday, wished her happy BD    Insurance won't pay for mesalamine, now on Lialda, due for f/up with GI and she will schedule    Am glucose 138 today  Had 2 covid vaccines and one booster  Had Tdap  11/2020    Review of Systems  Constitutional  No fatigue, no fevers, no chills, no unintentional weight loss,   HEENT:  No headaches, no dizziness, no double vision, no blurred vision, no hearing loss  Cardiovascular:  No chest pain, no palpitations, no shortness of breath with exertion (one flight of stairs),   Respiratory:  No cough, no hemoptysis, no wheezing, No shortness of breath at rest  GI:  No dysphagia, no odynophagia, no reflux, no abdominal pain, no nausea, no vomiting, no changes in bowel habits, no bright red blood per rectum, no melena  :  No urinary frequency, no dysuria, no urine incontinence  MSK:  No falls, no joint pain, no joint swelling  Neuro:  No tremors, no extremity weakness, no changes in sensation        Objective   Physical Exam  General:    Well-appearing  F in no acute distress, well nourished, well hydrated  Head:  Normocephalic, atraumatic  Skin:          Warm dry,   Eyes:  Anicteric sclera, pupils equal,   Oral:      Mask in place, Not examined due to pandemic  Neck:   Supple,  Cor:      Regular rate, normal S1, S2, no murmurs appreciated, no S3, no S4   Lungs:   Clear to auscultation b/l, no wheezes, no rhonchi, no crackles, no accessory respiratory muscle use  Ext:            No lower extremity edema, no palpable cords  Diabetic foot exam:  No inter digit lesions,monofilament plantar surface sensation intact  Pulses:      Pedal pulses intact  Neuro:   CN2-12 grossly intact  (except CN 9-10, 12 not examined due to pandemic)      Assessment/Plan   Problem List Items Addressed This Visit          Circulatory    Hypertension - Primary     Well controlled on 20 mg  lisinopril  Labs ordered CMP         Relevant Medications    lisinopril 20 mg tablet    Other Relevant Orders    Comprehensive Metabolic Panel (Completed)       Digestive    Ulcerative colitis (CMS/[facility])     Follows with GI Dr. Armstrong is due for appointment  Mesalamine no longer covered needs Lialda            Endocrine/Metabolic    Diabetes mellitus (CMS/[facility])     -sees optho regularly  -Labs ordered HBA1C CMP Urine albumin  -Last A1C was 8.5 (was 8.5) and Trandjenta was added, stillalso on metformin 1000 mg bid and glimepiride 4 mg daily  -On ACEI         Relevant Medications    OneTouch Delica Plus Lancet 33 gauge misc    blood sugar diagnostic (OneTouch Ultra Test) strip    Tradjenta 5 mg tablet    Other Relevant Orders    Comprehensive Metabolic Panel (Completed)    Hemoglobin A1C (Completed)    Albumin , Urine Random (Completed)    Follow Up In [facility]       Other    Hyperlipidemia     -on atorvastatin 40 mg LDL in October 2022 was 58         Relevant Medications    atorvastatin (Lipitor) 40 mg tablet    Other Relevant Orders    Comprehensive Metabolic Panel (Completed)    History of Graves' disease     Tsh 1.75 in October 2022          Other Visit Diagnoses       Type 2 diabetes mellitus without complications (CMS/[facility])        Relevant Medications    glimepiride (Amaryl) 4 mg tablet    Other Relevant Orders    Comprehensive Metabolic Panel (Completed)    Hemoglobin A1C (Completed)    Albumin , Urine Random (Completed)    Follow Up In [facility]               Brianna Woods DO    no

## 2023-10-25 ENCOUNTER — TELEPHONE (OUTPATIENT)
Dept: GASTROENTEROLOGY | Facility: CLINIC | Age: 63
End: 2023-10-25
Payer: COMMERCIAL

## 2023-10-25 DIAGNOSIS — R19.7 DIARRHEA, UNSPECIFIED TYPE: Primary | ICD-10-CM

## 2023-10-25 NOTE — TELEPHONE ENCOUNTER
Pt called she feels like she is having a flare, a lot of loose stools since last Thursday, drinking fiber and it is not helping to bulk her stool.  She is wanting to know what else she should be doing.

## 2023-10-26 ENCOUNTER — TELEPHONE (OUTPATIENT)
Dept: GASTROENTEROLOGY | Facility: CLINIC | Age: 63
End: 2023-10-26
Payer: COMMERCIAL

## 2023-10-30 ENCOUNTER — LAB (OUTPATIENT)
Dept: LAB | Facility: LAB | Age: 63
End: 2023-10-30
Payer: COMMERCIAL

## 2023-10-30 DIAGNOSIS — R19.7 DIARRHEA, UNSPECIFIED TYPE: ICD-10-CM

## 2023-10-30 PROCEDURE — 87328 CRYPTOSPORIDIUM AG IA: CPT

## 2023-10-30 PROCEDURE — 87506 IADNA-DNA/RNA PROBE TQ 6-11: CPT

## 2023-10-30 PROCEDURE — 87329 GIARDIA AG IA: CPT

## 2023-10-30 PROCEDURE — 87493 C DIFF AMPLIFIED PROBE: CPT

## 2023-10-30 PROCEDURE — 83993 ASSAY FOR CALPROTECTIN FECAL: CPT

## 2023-10-31 LAB
C COLI+JEJ+UPSA DNA STL QL NAA+PROBE: NOT DETECTED
C DIF TOX TCDA+TCDB STL QL NAA+PROBE: NOT DETECTED
EC STX1 GENE STL QL NAA+PROBE: NOT DETECTED
EC STX2 GENE STL QL NAA+PROBE: NOT DETECTED
NOROVIRUS GI + GII RNA STL NAA+PROBE: NOT DETECTED
RV RNA STL NAA+PROBE: NOT DETECTED
SALMONELLA DNA STL QL NAA+PROBE: NOT DETECTED
SHIGELLA DNA SPEC QL NAA+PROBE: NOT DETECTED
V CHOLERAE DNA STL QL NAA+PROBE: NOT DETECTED
Y ENTEROCOL DNA STL QL NAA+PROBE: NOT DETECTED

## 2023-11-03 LAB
CALPROTECTIN STL-MCNT: 86 UG/G
CRYPTOSP AG STL QL IA: NEGATIVE
G LAMBLIA AG STL QL IA: NEGATIVE

## 2023-11-06 LAB — O+P STL MICRO: NEGATIVE

## 2023-11-16 ENCOUNTER — TELEPHONE (OUTPATIENT)
Dept: GASTROENTEROLOGY | Facility: CLINIC | Age: 63
End: 2023-11-16
Payer: COMMERCIAL

## 2023-11-16 DIAGNOSIS — K51.80 OTHER ULCERATIVE COLITIS WITHOUT COMPLICATION (MULTI): ICD-10-CM

## 2023-11-16 DIAGNOSIS — K51.80 OTHER ULCERATIVE COLITIS WITHOUT COMPLICATION (MULTI): Primary | ICD-10-CM

## 2023-11-16 RX ORDER — MESALAMINE 400 MG/1
800 CAPSULE, DELAYED RELEASE ORAL 3 TIMES DAILY
Qty: 540 CAPSULE | Refills: 3 | Status: SHIPPED | OUTPATIENT
Start: 2023-11-16 | End: 2023-11-17 | Stop reason: ALTCHOICE

## 2023-11-17 DIAGNOSIS — K51.90 ULCERATIVE COLITIS WITHOUT COMPLICATIONS, UNSPECIFIED LOCATION (MULTI): Primary | ICD-10-CM

## 2023-11-17 RX ORDER — MESALAMINE 0.38 G/1
1.5 CAPSULE, EXTENDED RELEASE ORAL DAILY
Qty: 360 CAPSULE | Refills: 3 | Status: SHIPPED | OUTPATIENT
Start: 2023-11-17 | End: 2024-11-16

## 2023-11-17 RX ORDER — MESALAMINE 400 MG/1
800 CAPSULE, DELAYED RELEASE ORAL 3 TIMES DAILY
Qty: 540 CAPSULE | Refills: 3 | OUTPATIENT
Start: 2023-11-17 | End: 2024-11-16

## 2023-11-20 ENCOUNTER — LAB (OUTPATIENT)
Dept: LAB | Facility: LAB | Age: 63
End: 2023-11-20
Payer: COMMERCIAL

## 2023-11-20 DIAGNOSIS — E11.9 TYPE 2 DIABETES MELLITUS WITHOUT COMPLICATION, WITHOUT LONG-TERM CURRENT USE OF INSULIN (MULTI): ICD-10-CM

## 2023-11-20 LAB
EST. AVERAGE GLUCOSE BLD GHB EST-MCNC: 160 MG/DL
HBA1C MFR BLD: 7.2 %

## 2023-11-20 PROCEDURE — 36415 COLL VENOUS BLD VENIPUNCTURE: CPT

## 2023-11-20 PROCEDURE — 83036 HEMOGLOBIN GLYCOSYLATED A1C: CPT

## 2023-12-06 ENCOUNTER — TELEMEDICINE (OUTPATIENT)
Dept: PHARMACY | Facility: HOSPITAL | Age: 63
End: 2023-12-06
Payer: COMMERCIAL

## 2023-12-06 DIAGNOSIS — E11.9 TYPE 2 DIABETES MELLITUS WITHOUT COMPLICATION, WITHOUT LONG-TERM CURRENT USE OF INSULIN (MULTI): ICD-10-CM

## 2023-12-06 NOTE — PROGRESS NOTES
"Subjective     Patient ID: Trevon Vyas is a 63 y.o. female who presents for No chief complaint on file..    Diabetes  She presents for her initial diabetic visit. She has type 2 diabetes mellitus. Her disease course has been improving. She is compliant with treatment all of the time. She is following a generally unhealthy and diabetic diet. She participates in exercise intermittently. Eye exam is current.     Now using Freestyle Carlita 3, numbers have improved significantly. No side effects or issues with low BG.     Avoiding greasy foods. Had recent flair of UC. Started taking a MVI and \"Probiotic\" however  product is not an probiotic, it is Livinggood Daily Microbe Cleanse-           Counseled to discuss with GI provider.     Allergies   Allergen Reactions    Sulfur Rash       Objective     Current DM Pharmacotherapy:   Metformin 500 mg tablet - 2 tablets with lunch and 2 tablets with dinner   Glimepiride 4 mg - 1 tablet with lunch  Tradjenta 5 mg - 1 tablet with dinner     SECONDARY PREVENTION  - Statin? Yes  - ACE-I/ARB? Yes  - Aspirin? Yes    Current monitoring regimen:   Patient is using: continuous glucose monitor - 3 times daily morning, before lunch, before dinner    SMBG Readings: Average: \"worse\" due to recent UC flair.     Any episodes of hypoglycemia? Yes  Hypoglycemia awareness? Yes    There were no vitals taken for this visit.    Pertinent PMH Review:  - PMH of Pancreatitis: No  - PMH/FH of Medullary Thyroid Cancer: No  - PMH of Retinopathy: Glaucoma  - PMH of Urinary Tract Infections: No    Lab Review  Lab Results   Component Value Date    BILITOT 0.5 08/25/2023    CALCIUM 9.4 08/25/2023    CO2 29 08/25/2023     08/25/2023    CREATININE 0.83 08/25/2023    GLUCOSE 185 (H) 08/25/2023    ALKPHOS 82 08/25/2023    K 4.1 08/25/2023    PROT 6.6 08/25/2023     08/25/2023    AST 19 08/25/2023    ALT 16 08/25/2023    BUN 12 08/25/2023    ANIONGAP 11 08/25/2023    ALBUMIN 4.3 08/25/2023    GFRF 79 " 08/25/2023     Lab Results   Component Value Date    TRIG 130 10/14/2022    CHOL 119 10/14/2022    HDL 35.2 (A) 10/14/2022     Lab Results   Component Value Date    HGBA1C 7.2 (H) 11/20/2023     The ASCVD Risk score (Sanjiv MOSHER, et al., 2019) failed to calculate for the following reasons:    The valid total cholesterol range is 130 to 320 mg/dL      Assessment/Plan     Problem List Items Addressed This Visit       Diabetes mellitus (CMS/[facility])     *Per patient does not meet income requirements for [facility] program- discussed revisiting options when patient obtains Medicare.     Type 2 diabetes mellitus, is not at goal. A1C elevated, goal <7%    Follow up: I recommend diabetes care be as needed.     Tsering He PharmD [facility]  Clinical Pharmacist     Continue all meds under the continuation of care with the referring provider and clinical pharmacy team

## 2023-12-08 ENCOUNTER — TELEPHONE (OUTPATIENT)
Dept: GASTROENTEROLOGY | Facility: CLINIC | Age: 63
End: 2023-12-08
Payer: COMMERCIAL

## 2023-12-08 NOTE — TELEPHONE ENCOUNTER
"Pt called stating just today she has started to have \"foam\" in her stools, I asked if it was mucus and she said yes, with a little bit of cramping also. She wants to know what to do?  "

## 2023-12-14 DIAGNOSIS — E11.9 TYPE 2 DIABETES MELLITUS WITHOUT COMPLICATION, WITHOUT LONG-TERM CURRENT USE OF INSULIN (MULTI): ICD-10-CM

## 2023-12-15 RX ORDER — LINAGLIPTIN 5 MG/1
5 TABLET, FILM COATED ORAL DAILY
Qty: 90 TABLET | Refills: 0 | Status: SHIPPED | OUTPATIENT
Start: 2023-12-15 | End: 2024-03-14

## 2024-01-05 ENCOUNTER — OFFICE VISIT (OUTPATIENT)
Dept: GASTROENTEROLOGY | Facility: CLINIC | Age: 64
End: 2024-01-05
Payer: COMMERCIAL

## 2024-01-05 VITALS — WEIGHT: 165.2 LBS | BODY MASS INDEX: 26.55 KG/M2 | HEIGHT: 66 IN

## 2024-01-05 DIAGNOSIS — K51.819 OTHER ULCERATIVE COLITIS WITH COMPLICATION (MULTI): Primary | ICD-10-CM

## 2024-01-05 PROCEDURE — 4010F ACE/ARB THERAPY RXD/TAKEN: CPT | Performed by: INTERNAL MEDICINE

## 2024-01-05 PROCEDURE — 99214 OFFICE O/P EST MOD 30 MIN: CPT | Performed by: INTERNAL MEDICINE

## 2024-01-05 PROCEDURE — 1036F TOBACCO NON-USER: CPT | Performed by: INTERNAL MEDICINE

## 2024-01-05 NOTE — PROGRESS NOTES
Subjective     History of Present Illness:     62 yo with HTN, DL, DM, GERD, history of ulcerative colitis (dx 2016) here for follow up. Last seen in August . KUB previously showed large amt of stool, improved after clean  out. Miralax led to soft, incomplete Bms, but Bms reported improved on increased dietary fiber. No bleeding or melena. Lialda pills were difficult to take due to size- recommended to split dose .  Called office reporting loose stools, without improvement on fiber. Bms watery, 10 x per day, abdominal cramping.  At times will have formed stools then back to have loose stool  Reduced diet to bland Stool studies negative, fecal calprotectin 86, Hgb 11.5 (8/23), CMP wnl.  Lialda changed to apriso.A week after changing, stools weren't as watery. Increased dietary fiber   Bms better but still frequent, 6 x per day, soft to loose, inconsistent.  No blood per rectum, no abd pain, + bloating.        colonoscopy 8/2020 -endoscopically normal- ascending/tx colon bx with mild focal active colitis, remainder biopsies normal.     Allergies  Allergies   Allergen Reactions    Sulfur Rash       Medications  Current Outpatient Medications   Medication Instructions    aspirin 81 mg, oral, Daily    atorvastatin (LIPITOR) 40 mg, oral, Daily    blood sugar diagnostic (OneTouch Ultra Test) strip TEST TWICE DAILY    blood-glucose sensor (FreeStyle Carlita 3 Sensor) device Use 1 sensor every 14 days to test blood glucose levels.    brimonidine (AlphaGAN P) 0.2 % ophthalmic solution 1 drop, Both Eyes, Every 12 hours    cholecalciferol, vitD3,/vit K2 (VITAMIN D3-VITAMIN K2 ORAL) 1 tablet, oral, Daily, 4000 units of Vitamin D3    cyanocobalamin (VITAMIN B-12) 100 mcg, oral, Daily    glimepiride (Amaryl) 4 mg tablet TAKE ONE TABLET BY MOUTH ONCE DAILY WITH BREAKFAST    lancets (OneTouch Delica Plus Lancet) 33 gauge misc Test blood sugar once every morning and test once before bedtime    lisinopril 20 mg, oral, Daily     mesalamine ER (APRISO) 1.5 g, oral, Daily, Do not crush or chew.    metFORMIN (Glucophage) 500 mg tablet TAKE 2 TABLETS BY MOUTH  TWICE DAILY WITH MEALS    omega 3-dha-epa-fish-turmeric 417 mg-120 mg- 276 mg-600 mg capsule 1 capsule, oral, Daily    omeprazole (PriLOSEC) 40 mg DR capsule 1 capsule, oral, Daily    Tradjenta 5 mg, oral, Daily    travoprost (Travatan Z) 0.004 % drops ophthalmic solution 1 drop, Both Eyes, Nightly        Objective   There were no vitals taken for this visit.   Physical Exam  Vitals reviewed.   Constitutional:       General: She is awake.   Cardiovascular:      Rate and Rhythm: Normal rate and regular rhythm.   Pulmonary:      Effort: Pulmonary effort is normal.      Breath sounds: Normal breath sounds.   Abdominal:      General: Bowel sounds are normal.      Palpations: Abdomen is soft.      Tenderness: There is no abdominal tenderness.   Neurological:      Mental Status: She is alert and oriented to person, place, and time.   Psychiatric:         Attention and Perception: Attention and perception normal.         Behavior: Behavior normal.               Assessment/Plan:      62 yo with DM, HTN, DL seen for follow up of ulcerative colitis. Recent symptoms of frequency, loose stools, fecal calprotectin 80s.  Minor improvement with change of lialda to apriso. Recommend colonoscopy and if persistent inflammation will change to entyvio.       Schedule colonoscopy Tuesday  Further recommendations pending findings       Molly Armstrong MD

## 2024-01-09 ENCOUNTER — OFFICE VISIT (OUTPATIENT)
Dept: GASTROENTEROLOGY | Facility: EXTERNAL LOCATION | Age: 64
End: 2024-01-09
Payer: COMMERCIAL

## 2024-01-09 ENCOUNTER — LAB REQUISITION (OUTPATIENT)
Dept: LAB | Facility: HOSPITAL | Age: 64
End: 2024-01-09
Payer: COMMERCIAL

## 2024-01-09 DIAGNOSIS — K51.90 ULCERATIVE COLITIS WITHOUT COMPLICATIONS, UNSPECIFIED LOCATION (MULTI): ICD-10-CM

## 2024-01-09 DIAGNOSIS — K57.30 DIVERTICULOSIS OF LARGE INTESTINE WITHOUT DIVERTICULITIS: Primary | ICD-10-CM

## 2024-01-09 DIAGNOSIS — K64.8 INTERNAL HEMORRHOIDS: ICD-10-CM

## 2024-01-09 DIAGNOSIS — K51.819 OTHER ULCERATIVE COLITIS WITH COMPLICATION (MULTI): ICD-10-CM

## 2024-01-09 DIAGNOSIS — Z86.010 PERSONAL HISTORY OF COLONIC POLYPS: ICD-10-CM

## 2024-01-09 DIAGNOSIS — D12.2 BENIGN NEOPLASM OF ASCENDING COLON: ICD-10-CM

## 2024-01-09 PROCEDURE — 1036F TOBACCO NON-USER: CPT | Performed by: INTERNAL MEDICINE

## 2024-01-09 PROCEDURE — 88305 TISSUE EXAM BY PATHOLOGIST: CPT

## 2024-01-09 PROCEDURE — 4010F ACE/ARB THERAPY RXD/TAKEN: CPT | Performed by: INTERNAL MEDICINE

## 2024-01-09 PROCEDURE — 0753T DGTZ GLS MCRSCP SLD LEVEL IV: CPT

## 2024-01-09 PROCEDURE — 45380 COLONOSCOPY AND BIOPSY: CPT | Performed by: INTERNAL MEDICINE

## 2024-01-09 PROCEDURE — 88305 TISSUE EXAM BY PATHOLOGIST: CPT | Performed by: PATHOLOGY

## 2024-01-09 PROCEDURE — 45385 COLONOSCOPY W/LESION REMOVAL: CPT | Performed by: INTERNAL MEDICINE

## 2024-01-12 ENCOUNTER — OFFICE VISIT (OUTPATIENT)
Dept: PRIMARY CARE | Facility: CLINIC | Age: 64
End: 2024-01-12
Payer: COMMERCIAL

## 2024-01-12 ENCOUNTER — LAB (OUTPATIENT)
Dept: LAB | Facility: LAB | Age: 64
End: 2024-01-12
Payer: COMMERCIAL

## 2024-01-12 VITALS
DIASTOLIC BLOOD PRESSURE: 78 MMHG | WEIGHT: 162.2 LBS | HEART RATE: 65 BPM | SYSTOLIC BLOOD PRESSURE: 118 MMHG | BODY MASS INDEX: 25.98 KG/M2

## 2024-01-12 DIAGNOSIS — Z86.39 HISTORY OF GRAVES' DISEASE: ICD-10-CM

## 2024-01-12 DIAGNOSIS — I10 PRIMARY HYPERTENSION: ICD-10-CM

## 2024-01-12 DIAGNOSIS — E11.9 TYPE 2 DIABETES MELLITUS WITHOUT COMPLICATION, WITHOUT LONG-TERM CURRENT USE OF INSULIN (MULTI): Primary | ICD-10-CM

## 2024-01-12 DIAGNOSIS — K51.80 OTHER ULCERATIVE COLITIS WITHOUT COMPLICATION (MULTI): ICD-10-CM

## 2024-01-12 DIAGNOSIS — E78.5 HYPERLIPIDEMIA, UNSPECIFIED HYPERLIPIDEMIA TYPE: ICD-10-CM

## 2024-01-12 DIAGNOSIS — Z12.31 ENCOUNTER FOR SCREENING MAMMOGRAM FOR MALIGNANT NEOPLASM OF BREAST: ICD-10-CM

## 2024-01-12 DIAGNOSIS — Z23 NEED FOR INFLUENZA VACCINATION: ICD-10-CM

## 2024-01-12 LAB
ALBUMIN SERPL BCP-MCNC: 4.6 G/DL (ref 3.4–5)
ALP SERPL-CCNC: 81 U/L (ref 33–136)
ALT SERPL W P-5'-P-CCNC: 22 U/L (ref 7–45)
ANION GAP SERPL CALC-SCNC: 13 MMOL/L (ref 10–20)
AST SERPL W P-5'-P-CCNC: 26 U/L (ref 9–39)
BILIRUB SERPL-MCNC: 0.7 MG/DL (ref 0–1.2)
BUN SERPL-MCNC: 11 MG/DL (ref 6–23)
CALCIUM SERPL-MCNC: 10 MG/DL (ref 8.6–10.6)
CHLORIDE SERPL-SCNC: 106 MMOL/L (ref 98–107)
CHOLEST SERPL-MCNC: 136 MG/DL (ref 0–199)
CHOLESTEROL/HDL RATIO: 3.7
CO2 SERPL-SCNC: 27 MMOL/L (ref 21–32)
CREAT SERPL-MCNC: 0.97 MG/DL (ref 0.5–1.05)
EGFRCR SERPLBLD CKD-EPI 2021: 65 ML/MIN/1.73M*2
ERYTHROCYTE [DISTWIDTH] IN BLOOD BY AUTOMATED COUNT: 12.6 % (ref 11.5–14.5)
GLUCOSE SERPL-MCNC: 146 MG/DL (ref 74–99)
HCT VFR BLD AUTO: 37.1 % (ref 36–46)
HDLC SERPL-MCNC: 36.3 MG/DL
HGB BLD-MCNC: 12.3 G/DL (ref 12–16)
LDLC SERPL CALC-MCNC: 73 MG/DL
MCH RBC QN AUTO: 28.5 PG (ref 26–34)
MCHC RBC AUTO-ENTMCNC: 33.2 G/DL (ref 32–36)
MCV RBC AUTO: 86 FL (ref 80–100)
NON HDL CHOLESTEROL: 100 MG/DL (ref 0–149)
NRBC BLD-RTO: 0 /100 WBCS (ref 0–0)
PLATELET # BLD AUTO: 318 X10*3/UL (ref 150–450)
POTASSIUM SERPL-SCNC: 4.2 MMOL/L (ref 3.5–5.3)
PROT SERPL-MCNC: 6.9 G/DL (ref 6.4–8.2)
RBC # BLD AUTO: 4.31 X10*6/UL (ref 4–5.2)
SODIUM SERPL-SCNC: 142 MMOL/L (ref 136–145)
T4 FREE SERPL-MCNC: 1.14 NG/DL (ref 0.78–1.48)
TRIGL SERPL-MCNC: 134 MG/DL (ref 0–149)
TSH SERPL-ACNC: 1.7 MIU/L (ref 0.44–3.98)
VLDL: 27 MG/DL (ref 0–40)
WBC # BLD AUTO: 5.5 X10*3/UL (ref 4.4–11.3)

## 2024-01-12 PROCEDURE — 85027 COMPLETE CBC AUTOMATED: CPT

## 2024-01-12 PROCEDURE — 80061 LIPID PANEL: CPT

## 2024-01-12 PROCEDURE — 84443 ASSAY THYROID STIM HORMONE: CPT

## 2024-01-12 PROCEDURE — 90686 IIV4 VACC NO PRSV 0.5 ML IM: CPT | Performed by: SPECIALIST

## 2024-01-12 PROCEDURE — 90471 IMMUNIZATION ADMIN: CPT | Performed by: SPECIALIST

## 2024-01-12 PROCEDURE — 3078F DIAST BP <80 MM HG: CPT | Performed by: SPECIALIST

## 2024-01-12 PROCEDURE — 80053 COMPREHEN METABOLIC PANEL: CPT

## 2024-01-12 PROCEDURE — 3048F LDL-C <100 MG/DL: CPT | Performed by: SPECIALIST

## 2024-01-12 PROCEDURE — 84439 ASSAY OF FREE THYROXINE: CPT

## 2024-01-12 PROCEDURE — 99396 PREV VISIT EST AGE 40-64: CPT | Performed by: SPECIALIST

## 2024-01-12 PROCEDURE — 4010F ACE/ARB THERAPY RXD/TAKEN: CPT | Performed by: SPECIALIST

## 2024-01-12 PROCEDURE — 1036F TOBACCO NON-USER: CPT | Performed by: SPECIALIST

## 2024-01-12 PROCEDURE — 36415 COLL VENOUS BLD VENIPUNCTURE: CPT

## 2024-01-12 PROCEDURE — 3074F SYST BP LT 130 MM HG: CPT | Performed by: SPECIALIST

## 2024-01-12 NOTE — PROGRESS NOTES
Subjective   Patient ID: Trevon Vyas is a 63 y.o. female who presents for Follow-up.  HPI    64 yo female Pmhx sig for DM2 HTN Hyperlipidemia Glaucoma, Graves, Ulcerative colitis, Glaucoma and COVID, was scheduled for follow-up but due for annual exam (Last done 10/14/2022)    Had UC flare lasted 3 months lost 10#  Mesalamine wasn't working     Colonoscopy 1/9/2023  Being careful with diet, may get changed to Entivio since Mesalamine was not working  No BM yet since colonoscopy    Plans to apply for disability       Allergies   Allergen Reactions    Sulfa (Sulfonamide Antibiotics) Rash    Sulfur Rash      Current Outpatient Medications   Medication Instructions    aspirin 81 mg, oral, Daily    atorvastatin (LIPITOR) 40 mg, oral, Daily    blood sugar diagnostic (OneTouch Ultra Test) strip TEST TWICE DAILY    blood-glucose sensor (FreeStyle Carlita 3 Sensor) device Use 1 sensor every 14 days to test blood glucose levels.    brimonidine (AlphaGAN P) 0.2 % ophthalmic solution 1 drop, Both Eyes, Every 12 hours    cholecalciferol, vitD3,/vit K2 (VITAMIN D3-VITAMIN K2 ORAL) 1 tablet, oral, Daily, 4000 units of Vitamin D3    cyanocobalamin (VITAMIN B-12) 100 mcg, oral, Daily    glimepiride (Amaryl) 4 mg tablet TAKE ONE TABLET BY MOUTH ONCE DAILY WITH BREAKFAST    lancets (OneTouch Delica Plus Lancet) 33 gauge misc Test blood sugar once every morning and test once before bedtime    lisinopril 20 mg, oral, Daily    mesalamine ER (APRISO) 1.5 g, oral, Daily, Do not crush or chew.    metFORMIN (Glucophage) 500 mg tablet TAKE 2 TABLETS BY MOUTH  TWICE DAILY WITH MEALS    omega 3-dha-epa-fish-turmeric 417 mg-120 mg- 276 mg-600 mg capsule 1 capsule, oral, Daily    omeprazole (PriLOSEC) 40 mg DR capsule 1 capsule, oral, Daily    Tradjenta 5 mg, oral, Daily    travoprost (Travatan Z) 0.004 % drops ophthalmic solution 1 drop, Both Eyes, Nightly        Review of Systems  Constitutional  No fatigue, no fevers, no chills, Positive  unintentional weight loss,   HEENT:  No headaches, no dizziness, eye exam scheduled  Cardiovascular:  No chest pain, no palpitations, no shortness of breath with exertion (one flight of stairs),   Respiratory:  No cough, no hemoptysis, no wheezing, No shortness of breath at rest  GI:  No dysphagia, no odynophagia, no reflux, no abdominal pain, no nausea, no vomiting  :  No urinary frequency, no dysuria, no urine incontinence  MSK:  No falls, no joint pain (wrist was sore but getting better), no joint swelling  Neuro:  No tremors, no extremity weakness, no changes in sensation    Physical Exam  /66   Pulse 65   Wt 73.6 kg (162 lb 3.2 oz)   BMI 25.98 kg/m²   118/78  General:    Well-appearing  F in no acute distress, well nourished, well hydrated  Head:  Normocephalic, atraumatic  Skin:          Warm dry,   Eyes:  Anicteric sclera, pupils equal,   Ears:        TMs intact  Oral:      Not examined due to pandemic  Neck:   Supple, no cervical/supraclavicular adenopathy, no thyromegaly or nodules appreciated on exam  Cor:      Regular rate, normal S1, S2, no murmurs appreciated, no S3, no S4   Lungs:   Clear to auscultation b/l, no wheezes, no rhonchi, no crackles, no accessory respiratory muscle use  Abd:          Soft, nontender, no guarding, no rebound, no hepatosplenomegaly appreciated   Ext:            No lower extremity edema, no palpable cords  Pulses:      Pedal pulses intact  Neuro:   CN2-12 grossly intact     Assessment/Plan   Problem List Items Addressed This Visit    None     Annual exam  -Administered annual influenza vaccine today  -Previously received both Pfizer Covid vaccines one booster, plans to get Covid vaccine   -Recommend RSV vaccine  -Previous Tdap in 11/2020 repeat 10 years  -Previously received PCV 20 10/14/2022  -Previous hysterectomy for fibroid  -Mammogram 3/28/2023 repeat 1 year, ordered  -Previous negative hepatitis C screen in December 2020  -Colonoscopy 1/9/2023  repeat 3  years  -Continue regular exercise as tolerated  -BMI 25  -L:abs ordered CMP CBC Fx Lipids    Ulcerative Colitis  -Management per GI    Hyperlipidemia  -Continue atorvastatin  -Labs ordered    Hypertension  -Well controlled on lisinopril    Diabetes  -On Glimepiride and Metformin and Tradjenta (takes half to whole tab) has had some low glucose readings  -Had elevated glucose in setting of UC flare,   -A1C better at 7.2  (11/20/23), ordered for after 2/23/24  -Plans optho exam soon 2/15/2024  -Follows with podiatry    Hx of graves   -Labs ordered TSH Free T4     Brianna Woods DO

## 2024-01-12 NOTE — PATIENT INSTRUCTIONS
You received influenza vaccine today  Recommend Covid vaccine  Recommend RSV vaccine (separate from other vaccines by 2 weeks)

## 2024-01-16 DIAGNOSIS — E11.9 TYPE 2 DIABETES MELLITUS WITHOUT COMPLICATION, WITHOUT LONG-TERM CURRENT USE OF INSULIN (MULTI): Primary | ICD-10-CM

## 2024-01-18 LAB
LABORATORY COMMENT REPORT: NORMAL
PATH REPORT.FINAL DX SPEC: NORMAL
PATH REPORT.GROSS SPEC: NORMAL
PATH REPORT.RELEVANT HX SPEC: NORMAL
PATH REPORT.TOTAL CANCER: NORMAL

## 2024-01-22 ENCOUNTER — APPOINTMENT (OUTPATIENT)
Dept: ORTHOPEDIC SURGERY | Facility: CLINIC | Age: 64
End: 2024-01-22
Payer: COMMERCIAL

## 2024-01-24 ENCOUNTER — TELEPHONE (OUTPATIENT)
Dept: GASTROENTEROLOGY | Facility: CLINIC | Age: 64
End: 2024-01-24
Payer: COMMERCIAL

## 2024-01-24 DIAGNOSIS — K58.0 IRRITABLE BOWEL SYNDROME WITH DIARRHEA: Primary | ICD-10-CM

## 2024-01-24 NOTE — TELEPHONE ENCOUNTER
"Pt called asking about bx results and medication you want her to take, she would like you to call her back please...       Surgical Pathology Exam: [id number]  Order: [id number]  Collected 1/9/2024 13:02       Status: Final result       Visible to patient: Yes (seen)    0 Result Notes      Component    FINAL DIAGNOSIS   A. COLON - ASCENDING, POLYP:   -- TUBULAR ADENOMA.     B. COLON - ASCENDING, BIOPSY:   -- QUIESCENT COLITIS; NO EVIDENCE OF DYSPLASIA.     C. COLON - TRANSVERSE, BIOPSY:   -- QUIESCENT COLITIS; NO EVIDENCE OF DYSPLASIA.     D. COLON - DESCENDING, BIOPSY:   -- QUIESCENT COLITIS; NO EVIDENCE OF DYSPLASIA.     E. RECTUM, BIOPSY:    -- QUIESCENT COLITIS; NO EVIDENCE OF DYSPLASIA.   Electronically signed by Aimee Hawkins MD on 1/18/2024 at 1712        By the signature on this report, the individual or group listed as making the Final Interpretation/Diagnosis certifies that they have reviewed this case.    Clinical History    High risk colon cancer surveillance: Personal history of adenoma less than 10mm in size.   Ulcerative colitis SURVEILLANCE: NL Colonoscopy.  Last colonoscopy: 2020.   Gross Description    A: Received in formalin, labeled with the patient's name and hospital number and \"1, colon, ascending polyp, cold snare\", are multiple fragments of tan, soft tissue aggregating to 2.3 x 0.3 x 0.2 cm. The specimen is submitted in toto in one cassette.  SBS  B: Received in formalin, labeled with the patient's name and hospital number and \"2, colon, ascending, cold forcep\", are multiple fragments of tan, soft tissue aggregating to 1.1 x 0.2 x 0.2 cm. The specimen is submitted in toto in one cassette.  SBS  C: Received in formalin, labeled with the patient's name and hospital number and \"3, colon, transverse, cold forcep\", are multiple fragments of tan, soft tissue aggregating to 1.4 x 0.2 x 0.2 cm. The specimen is submitted in toto in one cassette.  SBS  D: Received in formalin, labeled with the " "patient's name and hospital number and \"4, colon, descending, sigmoid, cold forcep\", are multiple fragments of tan, soft tissue aggregating to 2.1 x 0.3 x 0.2 cm. The specimen is submitted in toto in one cassette.  SBS  E: Received in formalin, labeled with the patient's name and hospital number and \"5, colon, rectum, cold forcep\", are multiple fragments of tan, soft tissue aggregating to 1.0 x 0.2 x 0.2 cm. The specimen is submitted in toto in one cassette.  SBS   Resulting Agency [facility]              Specimen Collected: 01/09/24 13:02 Last Resulted: 01/18/24 17:13       Order Details        View Encounter        Lab and Collection Details        Routing        Result History     View All Conversations on this Encounter           Scans on Order [id number]    Lab Result Document - Document on 1/18/2024  5:13 PM         Result Care Coordination      Patient Communication     Add Comments   Seen Back to Top           Result Report    Surgical Pathology Exam (Order #[id number]) on 1/9/24     Lab Component SmartPhrase Guide    Surgical Pathology Exam (Order #[id number]) on 1/9/24     "

## 2024-01-24 NOTE — RESULT ENCOUNTER NOTE
Discussed with patient; no signs of active UC, well controlled on mesalamine.  Suspect IBS-d; will treat with 2 weeks of xifaxan. Recommend repeat colonoscopy in 3 yrs.  Follow up visit in 4-6 weeks

## 2024-02-13 ENCOUNTER — OFFICE VISIT (OUTPATIENT)
Dept: GASTROENTEROLOGY | Facility: CLINIC | Age: 64
End: 2024-02-13
Payer: COMMERCIAL

## 2024-02-13 VITALS — WEIGHT: 161.4 LBS | HEIGHT: 66 IN | HEART RATE: 76 BPM | BODY MASS INDEX: 25.94 KG/M2 | OXYGEN SATURATION: 100 %

## 2024-02-13 DIAGNOSIS — K58.0 IRRITABLE BOWEL SYNDROME WITH DIARRHEA: ICD-10-CM

## 2024-02-13 DIAGNOSIS — K51.80 OTHER ULCERATIVE COLITIS WITHOUT COMPLICATION (MULTI): Primary | ICD-10-CM

## 2024-02-13 PROCEDURE — 1036F TOBACCO NON-USER: CPT | Performed by: INTERNAL MEDICINE

## 2024-02-13 PROCEDURE — 99214 OFFICE O/P EST MOD 30 MIN: CPT | Performed by: INTERNAL MEDICINE

## 2024-02-13 PROCEDURE — 3048F LDL-C <100 MG/DL: CPT | Performed by: INTERNAL MEDICINE

## 2024-02-13 PROCEDURE — 4010F ACE/ARB THERAPY RXD/TAKEN: CPT | Performed by: INTERNAL MEDICINE

## 2024-02-13 NOTE — PROGRESS NOTES
Subjective     History of Present Illness:   62 yo with HTN, DL, DM, GERD, history of ulcerative colitis (dx 2016) here for follow up. Last seen 6 weeks ago with multiple loose Bms per day and cramping. Fecal calprotectin 86, stool studies negative. Lialda changed to apriso, better but still 6 x per day soft to loose, + bloating.    Colonoscopy 1/9/2024 endoscopically normal, 5mm TA, normal TI. Biopsies showing quiescent colitis.  She was given xifaxan for SIBO vs IBS-d.  Completed 1 week ago. Doing well, reports no longer bloating or discomfort.   Bowel movements are now twice daily, not urgent, formed.   No rectal bleeding.  Feels much improved  Able to eat what she wants without urgency.  Still feeling mild incomplete evacuation.      colonoscopy 8/2020 -endoscopically normal- ascending/tx colon bx with mild focal active colitis, remainder biopsies normal.     Allergies  Allergies   Allergen Reactions    Sulfa (Sulfonamide Antibiotics) Rash    Sulfur Rash       Medications  Current Outpatient Medications   Medication Instructions    aspirin 81 mg, oral, Daily    atorvastatin (LIPITOR) 40 mg, oral, Daily    blood-glucose sensor (FreeStyle Carlita 3 Sensor) device Use 1 sensor every 14 days to test blood glucose levels.    brimonidine (AlphaGAN P) 0.2 % ophthalmic solution 1 drop, Both Eyes, Every 12 hours    cholecalciferol, vitD3,/vit K2 (VITAMIN D3-VITAMIN K2 ORAL) 1 tablet, oral, Daily, 4000 units of Vitamin D3    cyanocobalamin (VITAMIN B-12) 100 mcg, oral, Daily    glimepiride (Amaryl) 4 mg tablet TAKE ONE TABLET BY MOUTH ONCE DAILY WITH BREAKFAST    lisinopril 20 mg, oral, Daily    mesalamine ER (APRISO) 1.5 g, oral, Daily, Do not crush or chew.    metFORMIN (Glucophage) 500 mg tablet TAKE 2 TABLETS BY MOUTH  TWICE DAILY WITH MEALS    omega 3-dha-epa-fish-turmeric 417 mg-120 mg- 276 mg-600 mg capsule 1 capsule, oral, Daily    omeprazole (PriLOSEC) 40 mg DR capsule 1 capsule, oral, Daily    Tradjenta 5 mg,  oral, Daily    travoprost (Travatan Z) 0.004 % drops ophthalmic solution 1 drop, Both Eyes, Nightly        Objective   Visit Vitals  Pulse 76      Physical Exam  Vitals reviewed.   Constitutional:       General: She is awake.   Cardiovascular:      Rate and Rhythm: Normal rate and regular rhythm.   Pulmonary:      Effort: Pulmonary effort is normal.      Breath sounds: Normal breath sounds.   Abdominal:      General: Bowel sounds are normal.      Palpations: Abdomen is soft.      Tenderness: There is no abdominal tenderness.   Neurological:      Mental Status: She is alert and oriented to person, place, and time.   Psychiatric:         Attention and Perception: Attention and perception normal.         Behavior: Behavior normal.               Assessment/Plan:    62 yo with DM, HTN, DL seen for follow up of ulcerative colitis, frequent bowel movements and bloating.  Colonoscopy showing quiescent colitis, endoscopically normal.  Treated with xifaxan for IBS-d vs SIBO with improvement.     Rec  Start metamucil daily for incomplete evacuation  Continue apriso  Call if any changes    Follow up in 3 mo       Molly Armstrong MD

## 2024-02-15 ENCOUNTER — OFFICE VISIT (OUTPATIENT)
Dept: OPHTHALMOLOGY | Facility: CLINIC | Age: 64
End: 2024-02-15
Payer: COMMERCIAL

## 2024-02-15 DIAGNOSIS — H40.1131 PRIMARY OPEN ANGLE GLAUCOMA (POAG) OF BOTH EYES, MILD STAGE: Primary | ICD-10-CM

## 2024-02-15 PROCEDURE — 99214 OFFICE O/P EST MOD 30 MIN: CPT | Performed by: OPHTHALMOLOGY

## 2024-02-15 PROCEDURE — 92133 CPTRZD OPH DX IMG PST SGM ON: CPT | Performed by: OPHTHALMOLOGY

## 2024-02-15 ASSESSMENT — CONF VISUAL FIELD
OS_SUPERIOR_NASAL_RESTRICTION: 0
OD_INFERIOR_NASAL_RESTRICTION: 0
OS_INFERIOR_NASAL_RESTRICTION: 0
OD_SUPERIOR_TEMPORAL_RESTRICTION: 0
OD_INFERIOR_TEMPORAL_RESTRICTION: 0
OS_INFERIOR_TEMPORAL_RESTRICTION: 0
OS_SUPERIOR_TEMPORAL_RESTRICTION: 0
OD_NORMAL: 1
OS_NORMAL: 1
OD_SUPERIOR_NASAL_RESTRICTION: 0

## 2024-02-15 ASSESSMENT — TONOMETRY
OS_IOP_MMHG: 22
OD_IOP_MMHG: 20
IOP_METHOD: GOLDMANN APPLANATION

## 2024-02-15 ASSESSMENT — ENCOUNTER SYMPTOMS
RESPIRATORY NEGATIVE: 0
EYES NEGATIVE: 0
PSYCHIATRIC NEGATIVE: 0
MUSCULOSKELETAL NEGATIVE: 0
HEMATOLOGIC/LYMPHATIC NEGATIVE: 0
NEUROLOGICAL NEGATIVE: 0
ENDOCRINE NEGATIVE: 0
ALLERGIC/IMMUNOLOGIC NEGATIVE: 0
CONSTITUTIONAL NEGATIVE: 0
GASTROINTESTINAL NEGATIVE: 0
CARDIOVASCULAR NEGATIVE: 0

## 2024-02-15 ASSESSMENT — CUP TO DISC RATIO
OD_RATIO: 0.8
OS_RATIO: 0.85

## 2024-02-15 ASSESSMENT — VISUAL ACUITY
OD_CC: 20/20
METHOD: SNELLEN - LINEAR
OS_CC: 20/20
CORRECTION_TYPE: GLASSES

## 2024-02-15 ASSESSMENT — EXTERNAL EXAM - LEFT EYE: OS_EXAM: NORMAL

## 2024-02-15 ASSESSMENT — SLIT LAMP EXAM - LIDS
COMMENTS: GOOD POSITION
COMMENTS: GOOD POSITION

## 2024-02-15 ASSESSMENT — PACHYMETRY
OD_CT(UM): 519
OS_CT(UM): 515

## 2024-02-15 ASSESSMENT — EXTERNAL EXAM - RIGHT EYE: OD_EXAM: NORMAL

## 2024-02-15 NOTE — PROGRESS NOTES
1. NTG OU     - [place] (from [place], [place] dx`d with glaucoma 10+yrs ago, controlled wtih Travatan qhs ou. no h/o ocular or laser sx.      - Tmax < 20 per patient, /515, FH + (2 sisters and 1 brother), new tmax 7/14/22 21 OU     - No trauma, prolonged steroid use, cbd oil     - Gonio open 360     - HVF 24-2 (8/10/23): full OU, limited reliability OD  OCT, Optic Nerve - OU - Both Eyes          Right Eye  Images reviewed and comparison made to baseline.     Left Eye  Images reviewed and comparison made to baseline.     Notes  Mild thinning compared to baseline [place]             -IOP is up above  goal (new tmx) <15, pt reports good complaince     - Continue travatan qhs OU     -continue brim BID OU  -schedule slt OS then OD then f/u           2. Cataract OU     - Not visually significant     - Monitor created by:Miranda Sanders

## 2024-02-26 ENCOUNTER — LAB (OUTPATIENT)
Dept: LAB | Facility: LAB | Age: 64
End: 2024-02-26
Payer: COMMERCIAL

## 2024-02-26 DIAGNOSIS — E11.9 TYPE 2 DIABETES MELLITUS WITHOUT COMPLICATION, WITHOUT LONG-TERM CURRENT USE OF INSULIN (MULTI): ICD-10-CM

## 2024-02-26 LAB
EST. AVERAGE GLUCOSE BLD GHB EST-MCNC: 169 MG/DL
HBA1C MFR BLD: 7.5 %

## 2024-02-26 PROCEDURE — 83036 HEMOGLOBIN GLYCOSYLATED A1C: CPT

## 2024-02-26 PROCEDURE — 36415 COLL VENOUS BLD VENIPUNCTURE: CPT

## 2024-03-03 DIAGNOSIS — E11.9 TYPE 2 DIABETES MELLITUS WITHOUT COMPLICATION, WITHOUT LONG-TERM CURRENT USE OF INSULIN (MULTI): ICD-10-CM

## 2024-03-04 RX ORDER — METFORMIN HYDROCHLORIDE 500 MG/1
1000 TABLET ORAL
Qty: 360 TABLET | Refills: 1 | Status: SHIPPED | OUTPATIENT
Start: 2024-03-04

## 2024-03-11 DIAGNOSIS — E11.9 TYPE 2 DIABETES MELLITUS WITHOUT COMPLICATIONS (MULTI): ICD-10-CM

## 2024-03-12 RX ORDER — GLIMEPIRIDE 4 MG/1
TABLET ORAL
Qty: 90 TABLET | Refills: 0 | Status: SHIPPED | OUTPATIENT
Start: 2024-03-12 | End: 2024-05-16 | Stop reason: SDUPTHER

## 2024-03-13 DIAGNOSIS — E11.9 TYPE 2 DIABETES MELLITUS WITHOUT COMPLICATION, WITHOUT LONG-TERM CURRENT USE OF INSULIN (MULTI): ICD-10-CM

## 2024-03-14 RX ORDER — LINAGLIPTIN 5 MG/1
5 TABLET, FILM COATED ORAL DAILY
Qty: 90 TABLET | Refills: 0 | Status: SHIPPED | OUTPATIENT
Start: 2024-03-14

## 2024-03-29 ENCOUNTER — HOSPITAL ENCOUNTER (OUTPATIENT)
Dept: RADIOLOGY | Facility: CLINIC | Age: 64
Discharge: HOME | End: 2024-03-29
Payer: COMMERCIAL

## 2024-03-29 VITALS — HEIGHT: 68 IN | WEIGHT: 160 LBS | BODY MASS INDEX: 24.25 KG/M2

## 2024-03-29 DIAGNOSIS — Z12.31 ENCOUNTER FOR SCREENING MAMMOGRAM FOR MALIGNANT NEOPLASM OF BREAST: ICD-10-CM

## 2024-03-29 PROCEDURE — 77063 BREAST TOMOSYNTHESIS BI: CPT | Performed by: RADIOLOGY

## 2024-03-29 PROCEDURE — 77067 SCR MAMMO BI INCL CAD: CPT | Performed by: RADIOLOGY

## 2024-03-29 PROCEDURE — 77067 SCR MAMMO BI INCL CAD: CPT

## 2024-04-01 DIAGNOSIS — E78.5 HYPERLIPIDEMIA, UNSPECIFIED HYPERLIPIDEMIA TYPE: ICD-10-CM

## 2024-04-03 RX ORDER — ATORVASTATIN CALCIUM 40 MG/1
40 TABLET, FILM COATED ORAL DAILY
Qty: 90 TABLET | Refills: 2 | Status: SHIPPED | OUTPATIENT
Start: 2024-04-03

## 2024-05-03 ENCOUNTER — OFFICE VISIT (OUTPATIENT)
Dept: PRIMARY CARE | Facility: CLINIC | Age: 64
End: 2024-05-03
Payer: COMMERCIAL

## 2024-05-03 ENCOUNTER — LAB (OUTPATIENT)
Dept: LAB | Facility: LAB | Age: 64
End: 2024-05-03
Payer: COMMERCIAL

## 2024-05-03 VITALS
WEIGHT: 161.4 LBS | DIASTOLIC BLOOD PRESSURE: 74 MMHG | OXYGEN SATURATION: 96 % | RESPIRATION RATE: 16 BRPM | SYSTOLIC BLOOD PRESSURE: 126 MMHG | HEART RATE: 88 BPM | BODY MASS INDEX: 24.54 KG/M2

## 2024-05-03 DIAGNOSIS — Z00.00 HEALTH CARE MAINTENANCE: ICD-10-CM

## 2024-05-03 DIAGNOSIS — K51.90 ULCERATIVE COLITIS WITHOUT COMPLICATIONS, UNSPECIFIED LOCATION (MULTI): ICD-10-CM

## 2024-05-03 DIAGNOSIS — I10 PRIMARY HYPERTENSION: ICD-10-CM

## 2024-05-03 DIAGNOSIS — E78.5 HYPERLIPIDEMIA, UNSPECIFIED HYPERLIPIDEMIA TYPE: ICD-10-CM

## 2024-05-03 DIAGNOSIS — R53.83 FATIGUE, UNSPECIFIED TYPE: ICD-10-CM

## 2024-05-03 DIAGNOSIS — E11.9 TYPE 2 DIABETES MELLITUS WITHOUT COMPLICATION, WITHOUT LONG-TERM CURRENT USE OF INSULIN (MULTI): ICD-10-CM

## 2024-05-03 DIAGNOSIS — E11.9 TYPE 2 DIABETES MELLITUS WITHOUT COMPLICATION, WITHOUT LONG-TERM CURRENT USE OF INSULIN (MULTI): Primary | ICD-10-CM

## 2024-05-03 LAB
ALBUMIN SERPL BCP-MCNC: 4.2 G/DL (ref 3.4–5)
ALP SERPL-CCNC: 79 U/L (ref 33–136)
ALT SERPL W P-5'-P-CCNC: 17 U/L (ref 7–45)
ANION GAP SERPL CALC-SCNC: 10 MMOL/L (ref 10–20)
AST SERPL W P-5'-P-CCNC: 22 U/L (ref 9–39)
BILIRUB SERPL-MCNC: 0.5 MG/DL (ref 0–1.2)
BUN SERPL-MCNC: 13 MG/DL (ref 6–23)
CALCIUM SERPL-MCNC: 9.3 MG/DL (ref 8.6–10.6)
CHLORIDE SERPL-SCNC: 106 MMOL/L (ref 98–107)
CHOLEST SERPL-MCNC: 119 MG/DL (ref 0–199)
CHOLESTEROL/HDL RATIO: 3.5
CO2 SERPL-SCNC: 28 MMOL/L (ref 21–32)
CREAT SERPL-MCNC: 0.83 MG/DL (ref 0.5–1.05)
CREAT UR-MCNC: 124.5 MG/DL (ref 20–320)
EGFRCR SERPLBLD CKD-EPI 2021: 78 ML/MIN/1.73M*2
ERYTHROCYTE [DISTWIDTH] IN BLOOD BY AUTOMATED COUNT: 12.9 % (ref 11.5–14.5)
EST. AVERAGE GLUCOSE BLD GHB EST-MCNC: 163 MG/DL
GLUCOSE SERPL-MCNC: 174 MG/DL (ref 74–99)
HBA1C MFR BLD: 7.3 %
HCT VFR BLD AUTO: 35.2 % (ref 36–46)
HDLC SERPL-MCNC: 33.8 MG/DL
HGB BLD-MCNC: 11.8 G/DL (ref 12–16)
LDLC SERPL CALC-MCNC: 64 MG/DL
MCH RBC QN AUTO: 27.8 PG (ref 26–34)
MCHC RBC AUTO-ENTMCNC: 33.5 G/DL (ref 32–36)
MCV RBC AUTO: 83 FL (ref 80–100)
MICROALBUMIN UR-MCNC: <7 MG/L
MICROALBUMIN/CREAT UR: NORMAL MG/G{CREAT}
NON HDL CHOLESTEROL: 85 MG/DL (ref 0–149)
NRBC BLD-RTO: 0 /100 WBCS (ref 0–0)
PLATELET # BLD AUTO: 294 X10*3/UL (ref 150–450)
POTASSIUM SERPL-SCNC: 4.4 MMOL/L (ref 3.5–5.3)
PROT SERPL-MCNC: 6.4 G/DL (ref 6.4–8.2)
RBC # BLD AUTO: 4.25 X10*6/UL (ref 4–5.2)
SODIUM SERPL-SCNC: 140 MMOL/L (ref 136–145)
TRIGL SERPL-MCNC: 104 MG/DL (ref 0–149)
TSH SERPL-ACNC: 1.6 MIU/L (ref 0.44–3.98)
VLDL: 21 MG/DL (ref 0–40)
WBC # BLD AUTO: 4.8 X10*3/UL (ref 4.4–11.3)

## 2024-05-03 PROCEDURE — 82570 ASSAY OF URINE CREATININE: CPT

## 2024-05-03 PROCEDURE — 85027 COMPLETE CBC AUTOMATED: CPT

## 2024-05-03 PROCEDURE — 83036 HEMOGLOBIN GLYCOSYLATED A1C: CPT

## 2024-05-03 PROCEDURE — 3048F LDL-C <100 MG/DL: CPT | Performed by: SPECIALIST

## 2024-05-03 PROCEDURE — 3074F SYST BP LT 130 MM HG: CPT | Performed by: SPECIALIST

## 2024-05-03 PROCEDURE — 82043 UR ALBUMIN QUANTITATIVE: CPT

## 2024-05-03 PROCEDURE — 3078F DIAST BP <80 MM HG: CPT | Performed by: SPECIALIST

## 2024-05-03 PROCEDURE — 36415 COLL VENOUS BLD VENIPUNCTURE: CPT

## 2024-05-03 PROCEDURE — 84443 ASSAY THYROID STIM HORMONE: CPT

## 2024-05-03 PROCEDURE — 4010F ACE/ARB THERAPY RXD/TAKEN: CPT | Performed by: SPECIALIST

## 2024-05-03 PROCEDURE — 1159F MED LIST DOCD IN RCRD: CPT | Performed by: SPECIALIST

## 2024-05-03 PROCEDURE — 3051F HG A1C>EQUAL 7.0%<8.0%: CPT | Performed by: SPECIALIST

## 2024-05-03 PROCEDURE — 1160F RVW MEDS BY RX/DR IN RCRD: CPT | Performed by: SPECIALIST

## 2024-05-03 PROCEDURE — 99214 OFFICE O/P EST MOD 30 MIN: CPT | Performed by: SPECIALIST

## 2024-05-03 PROCEDURE — 80053 COMPREHEN METABOLIC PANEL: CPT

## 2024-05-03 PROCEDURE — 3062F POS MACROALBUMINURIA REV: CPT | Performed by: SPECIALIST

## 2024-05-03 PROCEDURE — 80061 LIPID PANEL: CPT

## 2024-05-03 NOTE — PROGRESS NOTES
Subjective   Patient ID: Trevon Vyas is a 64 y.o. female who presents for Follow-up.  HPI    63 yo female Pmhx sig for DM2 HTN Hyperlipidemia Glaucoma, Graves, Ulcerative colitis, Glaucoma and COVID, was scheduled for follow-up, now on Medicare Advantage with [employer].      Arrived a bit late    UC is flaring.  When she eats something it goes right through her, sees GI on the 7th  May want to try Entivyo, no water or pudding--no formed stool.  Flared in October x 2 mos and was treated with 2 weeks of medicine  Is watching what she eats (banana, rice and potato but otherwise watching carbs).   Said fiber causes UC flares (with cramps)    Glucose elevated and always when UC flares    HBA1C 7.5 in January   No longer working with Pharmacy team, thought it helped, will re-consult Pharmacy team    Has had some low glucoses in the 60-69, is aware does not go low over night not high (like 250)    Right eye sore - to get laser surgery b/l due to elevated pressure sees optho regularly  No eye pain no purulent discharge    Recommended warm compresses and contact optho    Allergies   Allergen Reactions    Sulfa (Sulfonamide Antibiotics) Rash    Sulfur Rash      Current Outpatient Medications   Medication Instructions    aspirin 81 mg, oral, Daily    atorvastatin (LIPITOR) 40 mg, oral, Daily    blood-glucose sensor (OtterologyStyle Carlita 3 Sensor) device Use 1 sensor every 14 days to test blood glucose levels.    brimonidine (AlphaGAN P) 0.2 % ophthalmic solution 1 drop, Both Eyes, Every 12 hours    cholecalciferol, vitD3,/vit K2 (VITAMIN D3-VITAMIN K2 ORAL) 1 tablet, oral, Daily, 4000 units of Vitamin D3    cyanocobalamin (VITAMIN B-12) 100 mcg, oral, Daily    glimepiride (Amaryl) 4 mg tablet TAKE ONE TABLET BY MOUTH ONCE DAILY WITH BREAKFAST    lisinopril 20 mg, oral, Daily    mesalamine ER (APRISO) 1.5 g, oral, Daily, Do not crush or chew.    metFORMIN (GLUCOPHAGE) 1,000 mg, oral, 2 times daily (morning and late afternoon)     omega 3-dha-epa-fish-turmeric 417 mg-120 mg- 276 mg-600 mg capsule 1 capsule, oral, Daily    omeprazole (PriLOSEC) 40 mg DR capsule 1 capsule, oral, Daily    rifAXIMin (XIFAXAN) 550 mg, oral, 3 times daily    Tradjenta 5 mg, oral, Daily    travoprost (Travatan Z) 0.004 % drops ophthalmic solution 1 drop, Both Eyes, Nightly        Review of Systems  Constitutional  Postive fatigue, no fevers, no chills, a little unintentional weight loss,   HEENT:  No headaches, no dizziness  Cardiovascular:  No chest pain, no palpitations  Respiratory:  No cough, No shortness of breath at rest  GI:  No dysphagia, no odynophagia, no reflux, no abdominal pain but abdominal cramping, no nausea, no vomiting, no BRBPR, watery or pudding stool with mucous   :  No urinary frequency, no dysuria, stress urine incontinence  MSK:  No falls, no joint pain    Physical Exam  /74   Pulse 88   Resp 16   Wt 73.2 kg (161 lb 6.4 oz)   SpO2 96%   BMI 24.54 kg/m²   126/74  General:    Well-appearing  F in no acute distress, well nourished, well hydrated  Head:  Normocephalic, atraumatic  Skin:          Warm dry,   Eyes:  Anicteric sclera, pupils equal, right eye lid with slight erythema and raised area ?stye, no purluent discharge no pain  Oral:      Not examined due to pandemic  Neck:   Supple,   Cor:      Regular rate, normal S1, S2, no murmurs appreciated, no S3, no S4   Lungs:   Clear to auscultation b/l, no wheezes, no rhonchi, no crackles, no accessory respiratory muscle use  Abd:          Soft, nontender, no guarding,    Assessment/Plan   Problem List Items Addressed This Visit       Hypertension     Well controlled on lisinopril 20 mg daily         Hyperlipidemia     Labs ordered CMP Fx Lipids  Continue 40 mg atorvastatin         Relevant Orders    Lipid Panel (Completed)    Comprehensive Metabolic Panel (Completed)    Diabetes mellitus (Multi) - Primary     Hopes to avoid insulin  On Tradjenta 5 mg metformin 1 gram bid and 4 mg  glimepiride  Will increase glimepiride to 6 mg glimepiride with bkfst (send new script to [facility] allan   Glucose today is 198 in office and didn't eat this morning  Est GFR 65         Relevant Orders    CBC (Completed)    Comprehensive Metabolic Panel (Completed)    Albumin , Urine Random (Completed)    Hemoglobin A1C (Completed)    Ulcerative colitis (Multi)     Management per GI  Has upcoming appointment  Experiencing unformed stool  Currently on mesalamine            Relevant Orders    CBC (Completed)    Health care maintenance     Prior influenza vaccine 1/2024  Recommend Covid vaccine  Prior Prevnar 20 on 10/14/2022  Prior Tdap 11/2020 repeat 10 yrs  Recommend RSV vaccine   Recommend Shingrix vaccines  Mammogram 3/29/2024  Never smoked  Prior hysterectomy  Plans to schedule Welcome to medicare visit in 3 mos         Fatigue     Unclear etiology  Hx of graves  Labs ordered         Relevant Orders    CBC (Completed)    TSH (Completed)        Brianna Woods DO

## 2024-05-03 NOTE — PATIENT INSTRUCTIONS
Recommend Covid vaccine this spring  Recommend Shingrix vaccines (wait several weeks after Covid)  Recommend RSV vaccine (separate from other vaccines by 2 weeks before fall)  Recommend annual influenza vaccine

## 2024-05-07 ENCOUNTER — OFFICE VISIT (OUTPATIENT)
Dept: GASTROENTEROLOGY | Facility: CLINIC | Age: 64
End: 2024-05-07
Payer: COMMERCIAL

## 2024-05-07 VITALS — BODY MASS INDEX: 24.4 KG/M2 | OXYGEN SATURATION: 99 % | HEART RATE: 89 BPM | HEIGHT: 68 IN | WEIGHT: 161 LBS

## 2024-05-07 DIAGNOSIS — R19.7 DIARRHEA, UNSPECIFIED TYPE: ICD-10-CM

## 2024-05-07 DIAGNOSIS — K51.80 OTHER ULCERATIVE COLITIS WITHOUT COMPLICATION (MULTI): Primary | ICD-10-CM

## 2024-05-07 DIAGNOSIS — K58.0 IRRITABLE BOWEL SYNDROME WITH DIARRHEA: ICD-10-CM

## 2024-05-07 PROCEDURE — 3048F LDL-C <100 MG/DL: CPT | Performed by: INTERNAL MEDICINE

## 2024-05-07 PROCEDURE — 99214 OFFICE O/P EST MOD 30 MIN: CPT | Performed by: INTERNAL MEDICINE

## 2024-05-07 PROCEDURE — 1036F TOBACCO NON-USER: CPT | Performed by: INTERNAL MEDICINE

## 2024-05-07 PROCEDURE — 3062F POS MACROALBUMINURIA REV: CPT | Performed by: INTERNAL MEDICINE

## 2024-05-07 PROCEDURE — 1160F RVW MEDS BY RX/DR IN RCRD: CPT | Performed by: INTERNAL MEDICINE

## 2024-05-07 PROCEDURE — 1159F MED LIST DOCD IN RCRD: CPT | Performed by: INTERNAL MEDICINE

## 2024-05-07 PROCEDURE — 4010F ACE/ARB THERAPY RXD/TAKEN: CPT | Performed by: INTERNAL MEDICINE

## 2024-05-07 PROCEDURE — 3051F HG A1C>EQUAL 7.0%<8.0%: CPT | Performed by: INTERNAL MEDICINE

## 2024-05-07 NOTE — PROGRESS NOTES
Subjective     History of Present Illness:     65 yo with HTN, DL, DM, GERD, history of ulcerative colitis (dx 2016) here for follow up. Last seen 6 weeks ago with multiple loose Bms per day and cramping. Fecal calprotectin 86, stool studies negative. Lialda changed to apriso, better but still 6 x per day soft to loose, + bloating.  Colonoscopy 1/9/2024 endoscopically normal, 5mm TA, normal TI. Biopsies showing quiescent colitis.  3 month follow up visit reporting recurrent bloating, cramping and diarrhea. Bms up to 10 x per day, at times loose to soft.  Did well after xifaxan for additional week then when resumed fiber for constipation recurrent sx as above.  No blood per rectum. Has lost weight and regained 10 pounds.   Associated urgency with bowel movements and nocturnal bowel movements.      colonoscopy 8/2020 -endoscopically normal- ascending/tx colon bx with mild focal active colitis, remainder biopsies normal.     Allergies  Allergies   Allergen Reactions    Sulfa (Sulfonamide Antibiotics) Rash    Sulfur Rash       Medications  Current Outpatient Medications   Medication Instructions    aspirin 81 mg, oral, Daily    atorvastatin (LIPITOR) 40 mg, oral, Daily    blood-glucose sensor (FreeStyle Carlita 3 Sensor) device Use 1 sensor every 14 days to test blood glucose levels.    brimonidine (AlphaGAN P) 0.2 % ophthalmic solution 1 drop, Both Eyes, Every 12 hours    cholecalciferol, vitD3,/vit K2 (VITAMIN D3-VITAMIN K2 ORAL) 1 tablet, oral, Daily, 4000 units of Vitamin D3    cyanocobalamin (VITAMIN B-12) 100 mcg, oral, Daily    glimepiride (Amaryl) 4 mg tablet TAKE ONE TABLET BY MOUTH ONCE DAILY WITH BREAKFAST    lisinopril 20 mg, oral, Daily    mesalamine ER (APRISO) 1.5 g, oral, Daily, Do not crush or chew.    metFORMIN (GLUCOPHAGE) 1,000 mg, oral, 2 times daily with meals    omega 3-dha-epa-fish-turmeric 417 mg-120 mg- 276 mg-600 mg capsule 1 capsule, oral, Daily    omeprazole (PriLOSEC) 40 mg DR capsule 1  capsule, oral, Daily    Tradjenta 5 mg, oral, Daily    travoprost (Travatan Z) 0.004 % drops ophthalmic solution 1 drop, Both Eyes, Nightly        Objective   Visit Vitals  Pulse 89      Physical Exam  Vitals reviewed.   Constitutional:       General: She is awake.   Cardiovascular:      Rate and Rhythm: Normal rate and regular rhythm.   Pulmonary:      Effort: Pulmonary effort is normal.      Breath sounds: Normal breath sounds.   Abdominal:      General: Bowel sounds are normal.      Palpations: Abdomen is soft.      Tenderness: There is no abdominal tenderness.   Neurological:      Mental Status: She is alert and oriented to person, place, and time.   Psychiatric:         Attention and Perception: Attention and perception normal.         Behavior: Behavior normal.             Assessment/Plan:    63 yo with DM, HTN, DL seen for follow up of ulcerative colitis, frequent bowel movements and bloating.  Recurrent diarrhea multiple times per day with urgency and abdominal cramping.  Colonoscopy showing quiescent colitis, endoscopically normal, stool studies negative. Some improvement with xifaxan however recurrent symptoms.  Possibly patient with diarrhea related to mesalamine. Will stop for 1 week and assess for improvement. Recommend retreatment with xifaxan given partial response.     Rec  Stop mesalamine to see if this is cause of diarrhea  Repeat treatment with xifaxan  May consider TCA for IBS-d if no improvement     Molly Armstrong MD

## 2024-05-09 DIAGNOSIS — K58.0 IRRITABLE BOWEL SYNDROME WITH DIARRHEA: Primary | ICD-10-CM

## 2024-05-13 ENCOUNTER — OFFICE VISIT (OUTPATIENT)
Dept: OPHTHALMOLOGY | Facility: CLINIC | Age: 64
End: 2024-05-13
Payer: COMMERCIAL

## 2024-05-13 DIAGNOSIS — H40.1131 PRIMARY OPEN ANGLE GLAUCOMA (POAG) OF BOTH EYES, MILD STAGE: Primary | ICD-10-CM

## 2024-05-13 PROCEDURE — 65855 TRABECULOPLASTY LASER SURG: CPT | Mod: LEFT SIDE | Performed by: OPHTHALMOLOGY

## 2024-05-13 ASSESSMENT — ENCOUNTER SYMPTOMS
PSYCHIATRIC NEGATIVE: 0
ALLERGIC/IMMUNOLOGIC NEGATIVE: 0
CARDIOVASCULAR NEGATIVE: 0
HEMATOLOGIC/LYMPHATIC NEGATIVE: 0
RESPIRATORY NEGATIVE: 0
GASTROINTESTINAL NEGATIVE: 0
EYES NEGATIVE: 0
NEUROLOGICAL NEGATIVE: 0
ENDOCRINE NEGATIVE: 0
CONSTITUTIONAL NEGATIVE: 0
MUSCULOSKELETAL NEGATIVE: 0

## 2024-05-16 DIAGNOSIS — E11.9 TYPE 2 DIABETES MELLITUS WITHOUT COMPLICATIONS (MULTI): ICD-10-CM

## 2024-05-16 PROBLEM — R53.83 FATIGUE: Status: ACTIVE | Noted: 2024-05-16

## 2024-05-16 PROBLEM — Z00.00 HEALTH CARE MAINTENANCE: Status: ACTIVE | Noted: 2024-05-16

## 2024-05-16 RX ORDER — GLIMEPIRIDE 4 MG/1
TABLET ORAL
Qty: 90 TABLET | Refills: 0 | Status: SHIPPED | OUTPATIENT
Start: 2024-05-16

## 2024-05-16 NOTE — ASSESSMENT & PLAN NOTE
Prior influenza vaccine 1/2024  Recommend Covid vaccine  Prior Prevnar 20 on 10/14/2022  Prior Tdap 11/2020 repeat 10 yrs  Recommend RSV vaccine   Recommend Shingrix vaccines  Mammogram 3/29/2024  Never smoked  Prior hysterectomy  Plans to schedule Welcome to medicare visit in 3 mos

## 2024-05-16 NOTE — ASSESSMENT & PLAN NOTE
Hopes to avoid insulin  On Tradjenta 5 mg metformin 1 gram bid and 4 mg glimepiride  Will increase glimepiride to 6 mg glimepiride with bkfst (send new script to [facility]   Glucose today is 198 in office and didn't eat this morning  Est GFR 65

## 2024-05-16 NOTE — ASSESSMENT & PLAN NOTE
Management per GI  Has upcoming appointment  Experiencing unformed stool  Currently on mesalamine

## 2024-05-27 DIAGNOSIS — I10 PRIMARY HYPERTENSION: ICD-10-CM

## 2024-05-29 ENCOUNTER — TELEPHONE (OUTPATIENT)
Dept: GASTROENTEROLOGY | Facility: CLINIC | Age: 64
End: 2024-05-29

## 2024-05-29 ENCOUNTER — OFFICE VISIT (OUTPATIENT)
Dept: OPHTHALMOLOGY | Facility: CLINIC | Age: 64
End: 2024-05-29
Payer: COMMERCIAL

## 2024-05-29 DIAGNOSIS — H40.1131 PRIMARY OPEN ANGLE GLAUCOMA (POAG) OF BOTH EYES, MILD STAGE: Primary | ICD-10-CM

## 2024-05-29 PROCEDURE — 65855 TRABECULOPLASTY LASER SURG: CPT | Mod: RIGHT SIDE | Performed by: OPHTHALMOLOGY

## 2024-05-29 RX ORDER — LISINOPRIL 20 MG/1
20 TABLET ORAL DAILY
Qty: 90 TABLET | Refills: 0 | Status: SHIPPED | OUTPATIENT
Start: 2024-05-29

## 2024-05-29 ASSESSMENT — TONOMETRY
OS_IOP_MMHG: 15
IOP_METHOD: GOLDMANN APPLANATION
OD_IOP_MMHG: 16
OD_IOP_MMHG: 17
IOP_METHOD: GOLDMANN APPLANATION

## 2024-05-29 ASSESSMENT — VISUAL ACUITY
CORRECTION_TYPE: GLASSES
METHOD: SNELLEN - LINEAR
OD_CC: 20/20

## 2024-05-29 ASSESSMENT — CONF VISUAL FIELD
OD_NORMAL: 1
OS_SUPERIOR_NASAL_RESTRICTION: 0
OS_INFERIOR_NASAL_RESTRICTION: 0
OS_SUPERIOR_TEMPORAL_RESTRICTION: 0
OD_SUPERIOR_NASAL_RESTRICTION: 0
OS_NORMAL: 1
OS_INFERIOR_TEMPORAL_RESTRICTION: 0
OD_INFERIOR_NASAL_RESTRICTION: 0
OD_SUPERIOR_TEMPORAL_RESTRICTION: 0
OD_INFERIOR_TEMPORAL_RESTRICTION: 0

## 2024-05-29 ASSESSMENT — CUP TO DISC RATIO
OD_RATIO: 0.8
OS_RATIO: 0.85

## 2024-05-29 ASSESSMENT — EXTERNAL EXAM - RIGHT EYE: OD_EXAM: NORMAL

## 2024-05-29 ASSESSMENT — ENCOUNTER SYMPTOMS: EYES NEGATIVE: 1

## 2024-05-29 ASSESSMENT — SLIT LAMP EXAM - LIDS
COMMENTS: GOOD POSITION
COMMENTS: GOOD POSITION

## 2024-05-29 ASSESSMENT — EXTERNAL EXAM - LEFT EYE: OS_EXAM: NORMAL

## 2024-05-29 ASSESSMENT — PACHYMETRY
OD_CT(UM): 519
OS_CT(UM): 515

## 2024-05-29 NOTE — TELEPHONE ENCOUNTER
Pt called stating that the xifaxan is not working she is still having watery diarrhea and the urgency is still there. She wanted to let you know.

## 2024-05-31 DIAGNOSIS — K51.90 ULCERATIVE COLITIS WITHOUT COMPLICATIONS, UNSPECIFIED LOCATION (MULTI): ICD-10-CM

## 2024-05-31 DIAGNOSIS — K58.0 IRRITABLE BOWEL SYNDROME WITH DIARRHEA: Primary | ICD-10-CM

## 2024-05-31 DIAGNOSIS — R19.7 DIARRHEA, UNSPECIFIED TYPE: ICD-10-CM

## 2024-05-31 RX ORDER — AMITRIPTYLINE HYDROCHLORIDE 25 MG/1
TABLET, FILM COATED ORAL NIGHTLY
Qty: 127 TABLET | Refills: 0 | Status: SHIPPED | OUTPATIENT
Start: 2024-05-31 | End: 2024-08-06

## 2024-05-31 NOTE — TELEPHONE ENCOUNTER
Called patient re symptoms. Has been having diarrhea, cramping urgency unchanged on xifaxan. Feeling tired. No rectal bleeding.  Thus far, negative stool studies, negative colonoscopy (quiescent colitis on bx, normal mucosa visually), fecal calprotectin borderline 86.    No improvement with holding apriso.  No new medications.  Recommend CT enterography to evaluate for possible small bowel disease.  Start amitryptyline 25mg at bedtime x 7 days then increase to 50 mg at bedtime for IBS- diarrhea.   Check celiac panel.     Will follow up with Ct results.

## 2024-06-03 ENCOUNTER — LAB (OUTPATIENT)
Dept: LAB | Facility: LAB | Age: 64
End: 2024-06-03
Payer: COMMERCIAL

## 2024-06-03 DIAGNOSIS — R19.7 DIARRHEA, UNSPECIFIED TYPE: ICD-10-CM

## 2024-06-03 DIAGNOSIS — K51.90 ULCERATIVE COLITIS WITHOUT COMPLICATIONS, UNSPECIFIED LOCATION (MULTI): ICD-10-CM

## 2024-06-03 LAB
BUN SERPL-MCNC: 11 MG/DL (ref 6–23)
CREAT SERPL-MCNC: 0.77 MG/DL (ref 0.5–1.05)
EGFRCR SERPLBLD CKD-EPI 2021: 86 ML/MIN/1.73M*2

## 2024-06-03 PROCEDURE — 83516 IMMUNOASSAY NONANTIBODY: CPT

## 2024-06-03 PROCEDURE — 84520 ASSAY OF UREA NITROGEN: CPT

## 2024-06-03 PROCEDURE — 82565 ASSAY OF CREATININE: CPT

## 2024-06-03 PROCEDURE — 36415 COLL VENOUS BLD VENIPUNCTURE: CPT

## 2024-06-04 LAB
GLIADIN PEPTIDE IGA SER IA-ACNC: 1.7 U/ML
TTG IGA SER IA-ACNC: <1 U/ML

## 2024-06-06 LAB
GLIADIN PEPTIDE IGG SER IA-ACNC: <0.56 FLU (ref 0–4.99)
TTG IGG SER IA-ACNC: <0.82 FLU (ref 0–4.99)

## 2024-06-17 ENCOUNTER — HOSPITAL ENCOUNTER (OUTPATIENT)
Dept: RADIOLOGY | Facility: HOSPITAL | Age: 64
Discharge: HOME | End: 2024-06-17
Payer: COMMERCIAL

## 2024-06-17 ENCOUNTER — APPOINTMENT (OUTPATIENT)
Dept: RADIOLOGY | Facility: CLINIC | Age: 64
End: 2024-06-17
Payer: COMMERCIAL

## 2024-06-17 DIAGNOSIS — R19.7 DIARRHEA, UNSPECIFIED TYPE: ICD-10-CM

## 2024-06-17 DIAGNOSIS — K51.90 ULCERATIVE COLITIS WITHOUT COMPLICATIONS, UNSPECIFIED LOCATION (MULTI): ICD-10-CM

## 2024-06-17 PROCEDURE — 74177 CT ABD & PELVIS W/CONTRAST: CPT

## 2024-06-17 PROCEDURE — 2550000001 HC RX 255 CONTRASTS: Performed by: INTERNAL MEDICINE

## 2024-06-17 PROCEDURE — 2500000005 HC RX 250 GENERAL PHARMACY W/O HCPCS: Performed by: INTERNAL MEDICINE

## 2024-06-17 PROCEDURE — 74177 CT ABD & PELVIS W/CONTRAST: CPT | Performed by: RADIOLOGY

## 2024-07-09 DIAGNOSIS — E11.9 TYPE 2 DIABETES MELLITUS WITHOUT COMPLICATIONS (MULTI): ICD-10-CM

## 2024-07-09 RX ORDER — GLIMEPIRIDE 4 MG/1
TABLET ORAL
Qty: 45 TABLET | Refills: 0 | Status: SHIPPED | OUTPATIENT
Start: 2024-07-09

## 2024-07-11 ENCOUNTER — APPOINTMENT (OUTPATIENT)
Dept: OPHTHALMOLOGY | Facility: CLINIC | Age: 64
End: 2024-07-11
Payer: COMMERCIAL

## 2024-07-11 DIAGNOSIS — H40.1131 PRIMARY OPEN ANGLE GLAUCOMA (POAG) OF BOTH EYES, MILD STAGE: Primary | ICD-10-CM

## 2024-07-11 PROCEDURE — 99214 OFFICE O/P EST MOD 30 MIN: CPT | Performed by: OPHTHALMOLOGY

## 2024-07-11 RX ORDER — TRAVOPROST OPHTHALMIC SOLUTION 0.04 MG/ML
1 SOLUTION OPHTHALMIC NIGHTLY
Qty: 7.5 ML | Refills: 3 | Status: SHIPPED | OUTPATIENT
Start: 2024-07-11 | End: 2025-07-11

## 2024-07-11 ASSESSMENT — CONF VISUAL FIELD
OS_SUPERIOR_TEMPORAL_RESTRICTION: 0
OD_INFERIOR_TEMPORAL_RESTRICTION: 0
OS_INFERIOR_TEMPORAL_RESTRICTION: 0
OD_NORMAL: 1
OS_SUPERIOR_NASAL_RESTRICTION: 0
OS_INFERIOR_NASAL_RESTRICTION: 0
OD_SUPERIOR_NASAL_RESTRICTION: 0
OD_SUPERIOR_TEMPORAL_RESTRICTION: 0
OS_NORMAL: 1
OD_INFERIOR_NASAL_RESTRICTION: 0

## 2024-07-11 ASSESSMENT — REFRACTION_WEARINGRX
OD_AXIS: 090
OD_SPHERE: -1.00
OS_SPHERE: -1.00
OS_AXIS: 050
OS_ADD: +2.50
OD_ADD: +2.50
OD_CYLINDER: -2.50
OS_CYLINDER: -2.00

## 2024-07-11 ASSESSMENT — SLIT LAMP EXAM - LIDS
COMMENTS: GOOD POSITION
COMMENTS: GOOD POSITION

## 2024-07-11 ASSESSMENT — CUP TO DISC RATIO
OS_RATIO: 0.85
OD_RATIO: 0.8

## 2024-07-11 ASSESSMENT — EXTERNAL EXAM - RIGHT EYE: OD_EXAM: NORMAL

## 2024-07-11 ASSESSMENT — VISUAL ACUITY
METHOD: SNELLEN - LINEAR
CORRECTION_TYPE: GLASSES
OS_CC+: -2
OS_CC: 20/30
OD_CC+: -2
OD_CC: 20/25

## 2024-07-11 ASSESSMENT — PACHYMETRY
OD_CT(UM): 519
OS_CT(UM): 515

## 2024-07-11 ASSESSMENT — TONOMETRY
OD_IOP_MMHG: 14
IOP_METHOD: GOLDMANN APPLANATION
OS_IOP_MMHG: 14

## 2024-07-11 ASSESSMENT — EXTERNAL EXAM - LEFT EYE: OS_EXAM: NORMAL

## 2024-07-11 NOTE — PROGRESS NOTES
1. NTG OU     - [place] (from [place], [place] dx`d with glaucoma 10+yrs ago, controlled wtih Travatan qhs ou. no h/o ocular or laser sx.      - Tmax < 20 per patient, /515, FH + (2 sisters and 1 brother), new tmax 7/14/22 21 OU     - No trauma, prolonged steroid use, cbd oil     - Gonio open 360     - HVF 24-2 (8/10/23): full OU, limited reliability OD  OCT, Optic Nerve - OU - Both Eyes          Right Eye  Images reviewed and comparison made to baseline.     Left Eye  Images reviewed and comparison made to baseline.     Notes  Mild thinning compared to baseline [place]          -S/P SLT ou   -IOP goal <15,  -NOW AT GOAL   - Continue travatan qhs OU     -continue brim BID OU  -rtc 6 months for HVF 24-2 and IOP check            2. Cataract OU     - Not visually significant     - Monitor created by:Miranda Sanders

## 2024-07-17 ENCOUNTER — TELEPHONE (OUTPATIENT)
Dept: PRIMARY CARE | Facility: CLINIC | Age: 64
End: 2024-07-17
Payer: COMMERCIAL

## 2024-07-18 DIAGNOSIS — K58.0 IRRITABLE BOWEL SYNDROME WITH DIARRHEA: ICD-10-CM

## 2024-07-18 RX ORDER — AMITRIPTYLINE HYDROCHLORIDE 25 MG/1
50 TABLET, FILM COATED ORAL NIGHTLY
Qty: 180 TABLET | Refills: 1 | Status: SHIPPED | OUTPATIENT
Start: 2024-07-18

## 2024-07-19 DIAGNOSIS — E11.9 TYPE 2 DIABETES MELLITUS WITHOUT COMPLICATION, WITHOUT LONG-TERM CURRENT USE OF INSULIN (MULTI): ICD-10-CM

## 2024-07-19 RX ORDER — BLOOD-GLUCOSE SENSOR
EACH MISCELLANEOUS
Qty: 3 EACH | Refills: 4 | Status: SHIPPED | OUTPATIENT
Start: 2024-07-19

## 2024-07-19 RX ORDER — BLOOD-GLUCOSE SENSOR
EACH MISCELLANEOUS
Qty: 6 EACH | Refills: 4 | Status: SHIPPED | OUTPATIENT
Start: 2024-07-19 | End: 2024-07-19

## 2024-08-06 DIAGNOSIS — E11.9 TYPE 2 DIABETES MELLITUS WITHOUT COMPLICATIONS (MULTI): ICD-10-CM

## 2024-08-06 RX ORDER — GLIMEPIRIDE 4 MG/1
TABLET ORAL
Qty: 135 TABLET | Refills: 1 | Status: SHIPPED | OUTPATIENT
Start: 2024-08-06

## 2024-08-09 ENCOUNTER — APPOINTMENT (OUTPATIENT)
Dept: PRIMARY CARE | Facility: CLINIC | Age: 64
End: 2024-08-09
Payer: COMMERCIAL

## 2024-08-09 ENCOUNTER — LAB (OUTPATIENT)
Dept: LAB | Facility: LAB | Age: 64
End: 2024-08-09
Payer: COMMERCIAL

## 2024-08-09 VITALS
SYSTOLIC BLOOD PRESSURE: 120 MMHG | HEIGHT: 68 IN | WEIGHT: 163 LBS | OXYGEN SATURATION: 99 % | BODY MASS INDEX: 24.71 KG/M2 | HEART RATE: 103 BPM | DIASTOLIC BLOOD PRESSURE: 74 MMHG

## 2024-08-09 DIAGNOSIS — K51.80 OTHER ULCERATIVE COLITIS WITHOUT COMPLICATION (MULTI): ICD-10-CM

## 2024-08-09 DIAGNOSIS — E11.9 TYPE 2 DIABETES MELLITUS WITHOUT COMPLICATION, WITHOUT LONG-TERM CURRENT USE OF INSULIN (MULTI): ICD-10-CM

## 2024-08-09 DIAGNOSIS — K21.9 GERD WITHOUT ESOPHAGITIS: ICD-10-CM

## 2024-08-09 DIAGNOSIS — Z82.49 FAMILY HISTORY OF CEREBRAL ANEURYSM: ICD-10-CM

## 2024-08-09 DIAGNOSIS — Z86.39 HISTORY OF GRAVES' DISEASE: ICD-10-CM

## 2024-08-09 DIAGNOSIS — E78.5 HYPERLIPIDEMIA, UNSPECIFIED HYPERLIPIDEMIA TYPE: ICD-10-CM

## 2024-08-09 DIAGNOSIS — Z00.00 WELCOME TO MEDICARE PREVENTIVE VISIT: Primary | ICD-10-CM

## 2024-08-09 DIAGNOSIS — Z12.31 ENCOUNTER FOR SCREENING MAMMOGRAM FOR MALIGNANT NEOPLASM OF BREAST: ICD-10-CM

## 2024-08-09 DIAGNOSIS — Z78.0 MENOPAUSE: ICD-10-CM

## 2024-08-09 DIAGNOSIS — I10 PRIMARY HYPERTENSION: ICD-10-CM

## 2024-08-09 LAB
ALBUMIN SERPL BCP-MCNC: 4.3 G/DL (ref 3.4–5)
ALP SERPL-CCNC: 86 U/L (ref 33–136)
ALT SERPL W P-5'-P-CCNC: 16 U/L (ref 7–45)
ANION GAP SERPL CALC-SCNC: 9 MMOL/L (ref 10–20)
AST SERPL W P-5'-P-CCNC: 19 U/L (ref 9–39)
BILIRUB SERPL-MCNC: 0.7 MG/DL (ref 0–1.2)
BUN SERPL-MCNC: 10 MG/DL (ref 6–23)
CALCIUM SERPL-MCNC: 9.5 MG/DL (ref 8.6–10.6)
CHLORIDE SERPL-SCNC: 105 MMOL/L (ref 98–107)
CHOLEST SERPL-MCNC: 124 MG/DL (ref 0–199)
CHOLESTEROL/HDL RATIO: 3.7
CO2 SERPL-SCNC: 28 MMOL/L (ref 21–32)
CREAT SERPL-MCNC: 0.88 MG/DL (ref 0.5–1.05)
EGFRCR SERPLBLD CKD-EPI 2021: 73 ML/MIN/1.73M*2
ERYTHROCYTE [DISTWIDTH] IN BLOOD BY AUTOMATED COUNT: 12.7 % (ref 11.5–14.5)
EST. AVERAGE GLUCOSE BLD GHB EST-MCNC: 177 MG/DL
GLUCOSE SERPL-MCNC: 147 MG/DL (ref 74–99)
HBA1C MFR BLD: 7.8 %
HCT VFR BLD AUTO: 38.5 % (ref 36–46)
HDLC SERPL-MCNC: 33.9 MG/DL
HGB BLD-MCNC: 12.2 G/DL (ref 12–16)
LDLC SERPL CALC-MCNC: 61 MG/DL
MCH RBC QN AUTO: 27.5 PG (ref 26–34)
MCHC RBC AUTO-ENTMCNC: 31.7 G/DL (ref 32–36)
MCV RBC AUTO: 87 FL (ref 80–100)
NON HDL CHOLESTEROL: 90 MG/DL (ref 0–149)
NRBC BLD-RTO: 0 /100 WBCS (ref 0–0)
PLATELET # BLD AUTO: 266 X10*3/UL (ref 150–450)
POTASSIUM SERPL-SCNC: 4.2 MMOL/L (ref 3.5–5.3)
PROT SERPL-MCNC: 6.7 G/DL (ref 6.4–8.2)
RBC # BLD AUTO: 4.44 X10*6/UL (ref 4–5.2)
SODIUM SERPL-SCNC: 138 MMOL/L (ref 136–145)
TRIGL SERPL-MCNC: 144 MG/DL (ref 0–149)
VLDL: 29 MG/DL (ref 0–40)
WBC # BLD AUTO: 5.7 X10*3/UL (ref 4.4–11.3)

## 2024-08-09 PROCEDURE — 80053 COMPREHEN METABOLIC PANEL: CPT

## 2024-08-09 PROCEDURE — 1158F ADVNC CARE PLAN TLK DOCD: CPT | Performed by: SPECIALIST

## 2024-08-09 PROCEDURE — 1160F RVW MEDS BY RX/DR IN RCRD: CPT | Performed by: SPECIALIST

## 2024-08-09 PROCEDURE — G0402 INITIAL PREVENTIVE EXAM: HCPCS | Performed by: SPECIALIST

## 2024-08-09 PROCEDURE — 1159F MED LIST DOCD IN RCRD: CPT | Performed by: SPECIALIST

## 2024-08-09 PROCEDURE — 3074F SYST BP LT 130 MM HG: CPT | Performed by: SPECIALIST

## 2024-08-09 PROCEDURE — 36415 COLL VENOUS BLD VENIPUNCTURE: CPT

## 2024-08-09 PROCEDURE — 3062F POS MACROALBUMINURIA REV: CPT | Performed by: SPECIALIST

## 2024-08-09 PROCEDURE — 3078F DIAST BP <80 MM HG: CPT | Performed by: SPECIALIST

## 2024-08-09 PROCEDURE — 85027 COMPLETE CBC AUTOMATED: CPT

## 2024-08-09 PROCEDURE — 3048F LDL-C <100 MG/DL: CPT | Performed by: SPECIALIST

## 2024-08-09 PROCEDURE — 4010F ACE/ARB THERAPY RXD/TAKEN: CPT | Performed by: SPECIALIST

## 2024-08-09 PROCEDURE — 80061 LIPID PANEL: CPT

## 2024-08-09 PROCEDURE — 1123F ACP DISCUSS/DSCN MKR DOCD: CPT | Performed by: SPECIALIST

## 2024-08-09 PROCEDURE — 3008F BODY MASS INDEX DOCD: CPT | Performed by: SPECIALIST

## 2024-08-09 PROCEDURE — 3051F HG A1C>EQUAL 7.0%<8.0%: CPT | Performed by: SPECIALIST

## 2024-08-09 PROCEDURE — 1036F TOBACCO NON-USER: CPT | Performed by: SPECIALIST

## 2024-08-09 PROCEDURE — 83036 HEMOGLOBIN GLYCOSYLATED A1C: CPT

## 2024-08-09 PROCEDURE — 1170F FXNL STATUS ASSESSED: CPT | Performed by: SPECIALIST

## 2024-08-09 RX ORDER — METFORMIN HYDROCHLORIDE 500 MG/1
1000 TABLET ORAL
Qty: 360 TABLET | Refills: 1 | Status: SHIPPED | OUTPATIENT
Start: 2024-08-09

## 2024-08-09 RX ORDER — LISINOPRIL 20 MG/1
20 TABLET ORAL DAILY
Qty: 90 TABLET | Refills: 1 | Status: SHIPPED | OUTPATIENT
Start: 2024-08-09

## 2024-08-09 ASSESSMENT — ENCOUNTER SYMPTOMS
DEPRESSION: 0
LOSS OF SENSATION IN FEET: 0
OCCASIONAL FEELINGS OF UNSTEADINESS: 0

## 2024-08-09 ASSESSMENT — PATIENT HEALTH QUESTIONNAIRE - PHQ9
SUM OF ALL RESPONSES TO PHQ9 QUESTIONS 1 AND 2: 0
2. FEELING DOWN, DEPRESSED OR HOPELESS: NOT AT ALL
SUM OF ALL RESPONSES TO PHQ9 QUESTIONS 1 AND 2: 0
1. LITTLE INTEREST OR PLEASURE IN DOING THINGS: NOT AT ALL
2. FEELING DOWN, DEPRESSED OR HOPELESS: NOT AT ALL
1. LITTLE INTEREST OR PLEASURE IN DOING THINGS: NOT AT ALL

## 2024-08-09 ASSESSMENT — ACTIVITIES OF DAILY LIVING (ADL)
DRESSING: INDEPENDENT
DOING_HOUSEWORK: INDEPENDENT
MANAGING_FINANCES: INDEPENDENT
TAKING_MEDICATION: INDEPENDENT
BATHING: INDEPENDENT
GROCERY_SHOPPING: INDEPENDENT

## 2024-08-09 NOTE — PROGRESS NOTES
Subjective   Patient ID: Trevon Vyas is a 64 y.o. female who presents for Annual Exam.  HPI    63 yo female Pmhx sig for DM2 HTN Hyperlipidemia Glaucoma, Graves, Ulcerative colitis, Glaucoma and COVID now on Medicare Advantage with [employer] and here for Welcome to Medicare    Does not have living will and health care POA   would make medical decisions if she were unable, Chito Vyas, [phone]    Goals of care:  Treat treatable conditions, but would not want to prolong the act of dying through extraordinay if no hope for meaningful recovery.      Allergies   Allergen Reactions    Sulfa (Sulfonamide Antibiotics) Rash    Sulfur Rash      Current Outpatient Medications   Medication Instructions    amitriptyline (ELAVIL) 50 mg, oral, Nightly    aspirin 81 mg, oral, Daily    atorvastatin (LIPITOR) 40 mg, oral, Daily    blood-glucose sensor (FreeStyle Carlita 3 Sensor) device Use 1 sensor every 14 days to test blood glucose levels.    brimonidine (AlphaGAN P) 0.2 % ophthalmic solution 1 drop, Both Eyes, Every 12 hours    cholecalciferol, vitD3,/vit K2 (VITAMIN D3-VITAMIN K2 ORAL) 1 tablet, oral, Daily, 4000 units of Vitamin D3    cyanocobalamin (VITAMIN B-12) 100 mcg, oral, Daily    glimepiride (Amaryl) 4 mg tablet TAKE ONE AND ONE-HALF TABLETS (6 MG) BY MOUTH ONCE DAILY WITH BREAKFAST    lisinopril 20 mg, oral, Daily    metFORMIN (GLUCOPHAGE) 1,000 mg, oral, 2 times daily (morning and late afternoon)    omega 3-dha-epa-fish-turmeric 417 mg-120 mg- 276 mg-600 mg capsule 1 capsule, oral, Daily    omeprazole (PriLOSEC) 40 mg DR capsule 1 capsule, oral, Daily    Tradjenta 5 mg, oral, Daily    travoprost (Travatan Z) 0.004 % drops ophthalmic solution 1 drop, Both Eyes, Nightly        Review of Systems  Constitutional  Some fatigue, no fevers, no chills, lost weight with flare gained some back    HEENT:  No headaches, no dizziness, no double vision, no blurred vision, eye exams current, (laser on both eyes) no hearing  "loss  Cardiovascular:  No chest pain, no palpitations, no shortness of breath with exertion (one flight of stairs),   Respiratory:  No cough, no hemoptysis, no wheezing, No shortness of breath at rest  GI:  No dysphagia, no odynophagia, Occasional reflux only when colitis flare, no abdominal pain, no nausea, no vomiting, no changes in bowel habits, no bright red blood per rectum, no melena but dark stool when colitis flares  :  No urinary frequency, no dysuria, no urine incontinence  MSK:  No falls, no joint pain, no joint swelling  Neuro:  No tremors, no extremity weakness, no changes in sensation  Breasts:  No abnormalities on self breast exam no nipple discharge no skin changes    Physical Exam  /74   Pulse 103   Ht 1.727 m (5' 7.99\")   Wt 73.9 kg (163 lb) Comment: with shoes  SpO2 99%   BMI 24.79 kg/m²   General:    Well-appearing  F in no acute distress, well nourished, well hydrated  Head:  Normocephalic, atraumatic  Skin:          Warm dry,   Eyes:  Anicteric sclera, pupils equal,   Ears:        TMs intact  Oral:      Not examined due to pandemic  Neck:   Supple, no cervical/supraclavicular adenopathy, no thyromegaly or nodules appreciated on exam  Cor:      Regular rate, normal S1, S2, no murmurs appreciated, no S3, no S4   Lungs:   Clear to auscultation b/l, no wheezes, no rhonchi, no crackles, no accessory respiratory muscle use  Abd:          Soft, nontender, no guarding, no rebound, no hepatosplenomegaly appreciated   Ext:            No lower extremity edema, no palpable cords  DM foot exam:  No interdigit lesions, monofilament plantar surface sensation intact, plantar skin intact  Pulses:      Pedal pulses intact  Neuro:   CN2-12 grossly intact (except funduscopic exam not performed)  Breasts:     No Axillary adenopathy, no discrete palpable breast nodules, no overlying skin changes, no nipple discharge    Assessment/Plan   Problem List Items Addressed This Visit       Hypertension    " Hyperlipidemia    History of Graves' disease    Diabetes mellitus (Multi)     Other Visit Diagnoses       Welcome to Medicare preventive visit    -  Primary    Encounter for screening mammogram for malignant neoplasm of breast        Menopause              Welcome to medicare  -Plans annual influenza vaccine in fall  -Recommend Covid vaccine in fall  -Recommend RSV  -Recommend Shingrix Vaccines  -Tdap 11/30/2020 repeat 10 yrs  -Prior Prevnar 20 due to DM in 2022 befroe 64, recommend repeat pneumonia vaccine in 5 yrs  -Neg Hep C 12/2020  -Mammog 3/29/24 ordered  -Colonoscopy 1/9/2024, 5 mm TA  repeat 3 years  -DXA ordered  -Labs ordered CMP CBC Fx Lipids HBA1C Ualbumin    Breast cancer screening  -Mammog 3/29/2024, ordered    Menopause  -DXA ordered    Fam Hx Aneurysm  -Father had cerebral aneurysm  -Does not have any metal in her body that cannot be removed  -MRA ordered    Htn  -BP well controlled on current medication  -Refilled lisinopril    Hyperlipidemia  -LDL 64 (5/3/2024)  -Continue atorvastatin 40 mg daily    DM2  -HBA1C 7.3 (5/16/24)  -BP at goal <130/80  -On ACEI  -Has been buying her own own onelia 3 for CGM  -Continue metformin 500 mg 2 tabs bid, refilled, and Amaryl 4 mg (1.5 tabs lunch= 6 mg) and Tradjenta 5 mg  -Labs ordered CMP HBA1C Ualbumin    Gerd  -Occasionally experiences Reflux symptoms (typically when her colitis is bothering her)    UC  -Management per GI  -Colonoscopy 1/9/2024, 5 mm TA  repeat 3 years, quiescent colitis on bx, borderline fecal calprotectin 86   -On Elavil    Hx of Graves  -TSH 5/3/2024 was normal at 1.6       Brianna Woods DO

## 2024-08-09 NOTE — PATIENT INSTRUCTIONS
Recommend annual influenza vaccine in fall  Recommend Covid vaccine in the fall   Recommend RSV vaccine (separate from other vaccines by 2 weeks)  Recommend Shingrix vaccines

## 2024-08-15 PROBLEM — Z82.49 FAMILY HISTORY OF CEREBRAL ANEURYSM: Status: ACTIVE | Noted: 2024-08-15

## 2024-08-15 PROBLEM — Z12.31 ENCOUNTER FOR SCREENING MAMMOGRAM FOR MALIGNANT NEOPLASM OF BREAST: Status: ACTIVE | Noted: 2024-08-15

## 2024-08-15 PROBLEM — Z00.00 WELCOME TO MEDICARE PREVENTIVE VISIT: Status: ACTIVE | Noted: 2024-08-15

## 2024-08-16 ASSESSMENT — VISUAL ACUITY
OS_CC: 20/20
OD_CC: 20/20

## 2024-08-31 DIAGNOSIS — H40.1131 PRIMARY OPEN ANGLE GLAUCOMA (POAG) OF BOTH EYES, MILD STAGE: Primary | ICD-10-CM

## 2024-09-03 RX ORDER — BRIMONIDINE TARTRATE 2 MG/ML
1 SOLUTION/ DROPS OPHTHALMIC EVERY 12 HOURS
Qty: 30 ML | Refills: 3 | Status: SHIPPED | OUTPATIENT
Start: 2024-09-03

## 2024-09-04 DIAGNOSIS — E11.9 TYPE 2 DIABETES MELLITUS WITHOUT COMPLICATION, WITHOUT LONG-TERM CURRENT USE OF INSULIN (MULTI): Primary | ICD-10-CM

## 2024-09-04 RX ORDER — GLIMEPIRIDE 4 MG/1
8 TABLET ORAL
Qty: 100 TABLET | Refills: 1 | Status: SHIPPED | OUTPATIENT
Start: 2024-09-04 | End: 2025-10-09

## 2024-09-12 ENCOUNTER — TELEPHONE (OUTPATIENT)
Dept: GASTROENTEROLOGY | Facility: CLINIC | Age: 64
End: 2024-09-12
Payer: COMMERCIAL

## 2024-09-12 DIAGNOSIS — K58.0 IRRITABLE BOWEL SYNDROME WITH DIARRHEA: Primary | ICD-10-CM

## 2024-09-12 RX ORDER — ELUXADOLINE 100 MG/1
100 TABLET, FILM COATED ORAL 2 TIMES DAILY
Qty: 60 TABLET | Refills: 0 | Status: SHIPPED | OUTPATIENT
Start: 2024-09-12 | End: 2024-10-12

## 2024-09-12 NOTE — TELEPHONE ENCOUNTER
Pt called she has had diarrhea for about a week, states that last week it was a lot for several days, she said she is trying to drink water, but her mouth is so dry. She said she is still having diarrhea this week not as bad as last week. But she wants to know what to do?

## 2024-09-23 ENCOUNTER — HOSPITAL ENCOUNTER (OUTPATIENT)
Dept: RADIOLOGY | Facility: HOSPITAL | Age: 64
Discharge: HOME | End: 2024-09-23
Payer: COMMERCIAL

## 2024-09-23 DIAGNOSIS — Z78.0 MENOPAUSE: ICD-10-CM

## 2024-09-23 PROCEDURE — 77080 DXA BONE DENSITY AXIAL: CPT | Performed by: RADIOLOGY

## 2024-09-23 PROCEDURE — 77080 DXA BONE DENSITY AXIAL: CPT

## 2024-10-07 ENCOUNTER — TELEPHONE (OUTPATIENT)
Dept: GASTROENTEROLOGY | Facility: CLINIC | Age: 64
End: 2024-10-07
Payer: COMMERCIAL

## 2024-10-07 NOTE — TELEPHONE ENCOUNTER
D/w patient. Cramping and constipation on viberzi 100mg bid. Has not had bowel movement in 3 days, stopped on Friday.  Recommend starting miralax daily. Do not return to viberzi. Also had stopped amitriptyline . Call offic Friday with how she is doing . Will plan to resume amitriptyline once bowel movements resume.

## 2024-10-07 NOTE — TELEPHONE ENCOUNTER
Pt is requesting a phone call in regards to a medication she was told to stop taking which is called Viberzi

## 2024-10-08 ENCOUNTER — TELEPHONE (OUTPATIENT)
Dept: PRIMARY CARE | Facility: CLINIC | Age: 64
End: 2024-10-08

## 2024-10-08 DIAGNOSIS — Z02.71 ENCOUNTER FOR DISABILITY ASSESSMENT: Primary | ICD-10-CM

## 2024-10-11 ENCOUNTER — TELEPHONE (OUTPATIENT)
Dept: GASTROENTEROLOGY | Facility: CLINIC | Age: 64
End: 2024-10-11
Payer: COMMERCIAL

## 2024-10-11 DIAGNOSIS — R19.7 DIARRHEA, UNSPECIFIED TYPE: Primary | ICD-10-CM

## 2024-10-11 RX ORDER — ELUXADOLINE 75 MG/1
75 TABLET, FILM COATED ORAL 2 TIMES DAILY
Qty: 60 TABLET | Refills: 0 | Status: SHIPPED | OUTPATIENT
Start: 2024-10-11 | End: 2024-11-10

## 2024-10-11 NOTE — TELEPHONE ENCOUNTER
Pt state you told her to stop taking the viberzi and since then the cramping has stopped but the loose stool is back.  She wants to know if she could take 25mg in the morning and 25mg at night.

## 2024-10-18 NOTE — TELEPHONE ENCOUNTER
Hello,    Are you able to change the functional capacity test to STAT so this patient can be seen sooner?     Thanks

## 2024-10-21 DIAGNOSIS — Z02.71 ENCOUNTER FOR DISABILITY ASSESSMENT: Primary | ICD-10-CM

## 2024-10-23 DIAGNOSIS — K21.9 GERD WITHOUT ESOPHAGITIS: ICD-10-CM

## 2024-10-24 RX ORDER — OMEPRAZOLE 40 MG/1
40 CAPSULE, DELAYED RELEASE ORAL DAILY
Qty: 90 CAPSULE | Refills: 3 | Status: SHIPPED | OUTPATIENT
Start: 2024-10-24

## 2024-10-25 ENCOUNTER — TELEPHONE (OUTPATIENT)
Dept: PRIMARY CARE | Facility: CLINIC | Age: 64
End: 2024-10-25

## 2024-11-15 ENCOUNTER — PATIENT MESSAGE (OUTPATIENT)
Dept: PRIMARY CARE | Facility: CLINIC | Age: 64
End: 2024-11-15
Payer: COMMERCIAL

## 2024-11-17 DIAGNOSIS — E78.5 HYPERLIPIDEMIA, UNSPECIFIED HYPERLIPIDEMIA TYPE: ICD-10-CM

## 2024-11-17 DIAGNOSIS — E11.9 TYPE 2 DIABETES MELLITUS WITHOUT COMPLICATION, WITHOUT LONG-TERM CURRENT USE OF INSULIN (MULTI): ICD-10-CM

## 2024-11-19 RX ORDER — GLIMEPIRIDE 4 MG/1
TABLET ORAL
Qty: 180 TABLET | Refills: 0 | Status: SHIPPED | OUTPATIENT
Start: 2024-11-19

## 2024-11-19 RX ORDER — ATORVASTATIN CALCIUM 40 MG/1
40 TABLET, FILM COATED ORAL DAILY
Qty: 90 TABLET | Refills: 1 | Status: SHIPPED | OUTPATIENT
Start: 2024-11-19

## 2025-01-16 ENCOUNTER — APPOINTMENT (OUTPATIENT)
Dept: OPHTHALMOLOGY | Facility: CLINIC | Age: 65
End: 2025-01-16
Payer: COMMERCIAL

## 2025-01-17 ENCOUNTER — OFFICE VISIT (OUTPATIENT)
Dept: PRIMARY CARE | Facility: CLINIC | Age: 65
End: 2025-01-17
Payer: COMMERCIAL

## 2025-01-17 ENCOUNTER — LAB (OUTPATIENT)
Dept: LAB | Facility: LAB | Age: 65
End: 2025-01-17
Payer: COMMERCIAL

## 2025-01-17 VITALS
HEART RATE: 73 BPM | SYSTOLIC BLOOD PRESSURE: 128 MMHG | BODY MASS INDEX: 25.37 KG/M2 | WEIGHT: 166.8 LBS | DIASTOLIC BLOOD PRESSURE: 78 MMHG

## 2025-01-17 DIAGNOSIS — R39.9 UTI SYMPTOMS: ICD-10-CM

## 2025-01-17 DIAGNOSIS — N94.89 VAGINAL BURNING: ICD-10-CM

## 2025-01-17 DIAGNOSIS — Z86.39 HISTORY OF GRAVES' DISEASE: ICD-10-CM

## 2025-01-17 DIAGNOSIS — E11.9 TYPE 2 DIABETES MELLITUS WITHOUT COMPLICATION, WITHOUT LONG-TERM CURRENT USE OF INSULIN (MULTI): ICD-10-CM

## 2025-01-17 DIAGNOSIS — I10 PRIMARY HYPERTENSION: ICD-10-CM

## 2025-01-17 DIAGNOSIS — Z00.00 HEALTH CARE MAINTENANCE: ICD-10-CM

## 2025-01-17 DIAGNOSIS — E11.9 TYPE 2 DIABETES MELLITUS WITHOUT COMPLICATION, WITHOUT LONG-TERM CURRENT USE OF INSULIN (MULTI): Primary | ICD-10-CM

## 2025-01-17 DIAGNOSIS — E78.5 HYPERLIPIDEMIA, UNSPECIFIED HYPERLIPIDEMIA TYPE: ICD-10-CM

## 2025-01-17 DIAGNOSIS — K51.80 OTHER ULCERATIVE COLITIS WITHOUT COMPLICATION: ICD-10-CM

## 2025-01-17 LAB
ALBUMIN SERPL BCP-MCNC: 4.5 G/DL (ref 3.4–5)
ALP SERPL-CCNC: 81 U/L (ref 33–136)
ALT SERPL W P-5'-P-CCNC: 18 U/L (ref 7–45)
ANION GAP SERPL CALC-SCNC: 11 MMOL/L (ref 10–20)
APPEARANCE UR: CLEAR
AST SERPL W P-5'-P-CCNC: 24 U/L (ref 9–39)
BACTERIA #/AREA URNS AUTO: ABNORMAL /HPF
BILIRUB SERPL-MCNC: 0.5 MG/DL (ref 0–1.2)
BILIRUB UR STRIP.AUTO-MCNC: NEGATIVE MG/DL
BUN SERPL-MCNC: 11 MG/DL (ref 6–23)
CALCIUM SERPL-MCNC: 9.8 MG/DL (ref 8.6–10.6)
CHLORIDE SERPL-SCNC: 104 MMOL/L (ref 98–107)
CO2 SERPL-SCNC: 27 MMOL/L (ref 21–32)
COLOR UR: ABNORMAL
CREAT SERPL-MCNC: 0.89 MG/DL (ref 0.5–1.05)
CREAT UR-MCNC: 65.2 MG/DL (ref 20–320)
EGFRCR SERPLBLD CKD-EPI 2021: 72 ML/MIN/1.73M*2
ERYTHROCYTE [DISTWIDTH] IN BLOOD BY AUTOMATED COUNT: 12.9 % (ref 11.5–14.5)
EST. AVERAGE GLUCOSE BLD GHB EST-MCNC: 192 MG/DL
GLUCOSE SERPL-MCNC: 182 MG/DL (ref 74–99)
GLUCOSE UR STRIP.AUTO-MCNC: NORMAL MG/DL
HBA1C MFR BLD: 8.3 %
HCT VFR BLD AUTO: 37.8 % (ref 36–46)
HGB BLD-MCNC: 12.3 G/DL (ref 12–16)
HOLD SPECIMEN: NORMAL
KETONES UR STRIP.AUTO-MCNC: NEGATIVE MG/DL
LEUKOCYTE ESTERASE UR QL STRIP.AUTO: ABNORMAL
MCH RBC QN AUTO: 26.7 PG (ref 26–34)
MCHC RBC AUTO-ENTMCNC: 32.5 G/DL (ref 32–36)
MCV RBC AUTO: 82 FL (ref 80–100)
MICROALBUMIN UR-MCNC: <7 MG/L
MICROALBUMIN/CREAT UR: NORMAL MG/G{CREAT}
MUCOUS THREADS #/AREA URNS AUTO: ABNORMAL /LPF
NITRITE UR QL STRIP.AUTO: NEGATIVE
NRBC BLD-RTO: 0 /100 WBCS (ref 0–0)
PH UR STRIP.AUTO: 5 [PH]
PLATELET # BLD AUTO: 338 X10*3/UL (ref 150–450)
POTASSIUM SERPL-SCNC: 4.4 MMOL/L (ref 3.5–5.3)
PROT SERPL-MCNC: 7.1 G/DL (ref 6.4–8.2)
PROT UR STRIP.AUTO-MCNC: NEGATIVE MG/DL
RBC # BLD AUTO: 4.6 X10*6/UL (ref 4–5.2)
RBC # UR STRIP.AUTO: NEGATIVE /UL
RBC #/AREA URNS AUTO: ABNORMAL /HPF
SODIUM SERPL-SCNC: 138 MMOL/L (ref 136–145)
SP GR UR STRIP.AUTO: 1.01
SQUAMOUS #/AREA URNS AUTO: ABNORMAL /HPF
TSH SERPL-ACNC: 1.26 MIU/L (ref 0.44–3.98)
UROBILINOGEN UR STRIP.AUTO-MCNC: NORMAL MG/DL
WBC # BLD AUTO: 5.8 X10*3/UL (ref 4.4–11.3)
WBC #/AREA URNS AUTO: ABNORMAL /HPF

## 2025-01-17 PROCEDURE — 80053 COMPREHEN METABOLIC PANEL: CPT

## 2025-01-17 PROCEDURE — 1160F RVW MEDS BY RX/DR IN RCRD: CPT | Performed by: SPECIALIST

## 2025-01-17 PROCEDURE — 3078F DIAST BP <80 MM HG: CPT | Performed by: SPECIALIST

## 2025-01-17 PROCEDURE — 4010F ACE/ARB THERAPY RXD/TAKEN: CPT | Performed by: SPECIALIST

## 2025-01-17 PROCEDURE — 81001 URINALYSIS AUTO W/SCOPE: CPT

## 2025-01-17 PROCEDURE — 85027 COMPLETE CBC AUTOMATED: CPT

## 2025-01-17 PROCEDURE — 1159F MED LIST DOCD IN RCRD: CPT | Performed by: SPECIALIST

## 2025-01-17 PROCEDURE — 99214 OFFICE O/P EST MOD 30 MIN: CPT | Performed by: SPECIALIST

## 2025-01-17 PROCEDURE — 82043 UR ALBUMIN QUANTITATIVE: CPT

## 2025-01-17 PROCEDURE — 87086 URINE CULTURE/COLONY COUNT: CPT

## 2025-01-17 PROCEDURE — 82570 ASSAY OF URINE CREATININE: CPT

## 2025-01-17 PROCEDURE — 3074F SYST BP LT 130 MM HG: CPT | Performed by: SPECIALIST

## 2025-01-17 PROCEDURE — 83036 HEMOGLOBIN GLYCOSYLATED A1C: CPT

## 2025-01-17 PROCEDURE — 3062F POS MACROALBUMINURIA REV: CPT | Performed by: SPECIALIST

## 2025-01-17 PROCEDURE — 84443 ASSAY THYROID STIM HORMONE: CPT

## 2025-01-17 PROCEDURE — 3052F HG A1C>EQUAL 8.0%<EQUAL 9.0%: CPT | Performed by: SPECIALIST

## 2025-01-17 ASSESSMENT — ENCOUNTER SYMPTOMS
CONSTIPATION: 1
DIARRHEA: 1

## 2025-01-17 NOTE — ASSESSMENT & PLAN NOTE
A1C 7.8 in 8/2024  Continue annual diabetic eye exam (sees optho every 6 mos)  Buying own CGMs  Currently taking Metformin 1 gram bid, Glimepiride 4 mg (2 tabs) daily, and Tradjenta 5 mg daily  Discussed pharmacy team referral   Discussed Lantus insulin HS and willing to consider    Orders:    Albumin-Creatinine Ratio, Urine Random; Future    Hemoglobin A1C; Future    Comprehensive Metabolic Panel; Future    Referral to Clinical Pharmacy; Future    CBC; Future

## 2025-01-17 NOTE — PROGRESS NOTES
Subjective   Patient ID: Trevon Vyas is a 64 y.o. female who presents for Vaginal Discomfort.  HPI    64 yo female Pmhx sig for DM2 HTN Hyperlipidemia Glaucoma, Graves, Ulcerative colitis, Glaucoma and COVID now on Medicare Advantage with [employer] and here for f/up    Paying out of pocket for CGM  Last A1C 7.8 (8/2024)    Always high in morning 190-200, around 150 at bedtime  At 2 am wakes her because it is high 230  Last night corn bread and string beans but drinks Sweet Tea, discussed changing to lemon water, also uses Agave in tea  Tries to eat sweets after lunch not in evening    Takes tradjenda with metformin at dinner     Was in Urgent care for uti sx but urine was negative was treated with macrobid  Said clear vaginal discharge  Feels burning no itching.  Had before she took 5 days macrobid and then it went away, but now it is back off/on and having now this morning not daily  Does not have a gynecologist since hysterectomy total (no cervix no ovaries)    Allergies   Allergen Reactions    Sulfa (Sulfonamide Antibiotics) Rash    Sulfur Rash      Current Outpatient Medications   Medication Instructions    aspirin 81 mg, Daily    atorvastatin (LIPITOR) 40 mg, oral, Daily    blood-glucose sensor (FreeStyle Carlita 3 Sensor) device Use 1 sensor every 14 days to test blood glucose levels.    brimonidine (AlphaGAN) 0.2 % ophthalmic solution 1 drop, Both Eyes, Every 12 hours    cholecalciferol, vitD3,/vit K2 (VITAMIN D3-VITAMIN K2 ORAL) 1 tablet, Daily    cyanocobalamin (VITAMIN B-12) 100 mcg, Daily    glimepiride (Amaryl) 4 mg tablet TAKE 2 TABLETS BY MOUTH ONCE  DAILY IN THE MORNING BEFORE A  MEAL    lisinopril 20 mg, oral, Daily    metFORMIN (GLUCOPHAGE) 1,000 mg, oral, 2 times daily (morning and late afternoon)    omega 3-dha-epa-fish-turmeric 417 mg-120 mg- 276 mg-600 mg capsule 1 capsule, Daily    omeprazole (PRILOSEC) 40 mg, oral, Daily    Tradjenta 5 mg, oral, Daily    travoprost (Travatan Z) 0.004 % drops  ophthalmic solution 1 drop, Both Eyes, Nightly    Viberzi 75 mg, oral, 2 times daily        Review of Systems  Constitutional  No fatigue, no fevers, no chills, no unintentional weight loss,   HEENT:  No headaches, no dizziness, no double vision, no blurred vision, eye exam last year,  Cardiovascular:  No chest pain, no palpitations, no shortness of breath with exertion (one flight of stairs),   Respiratory:  No cough, no wheezing, No shortness of breath at rest  GI:  No dysphagia, no odynophagia, no reflux, occasional from colitis (constipation or diarrhea) abdominal pain, no nausea, no vomiting, no brbpr or melena  :  No urinary frequency, no dysuria, no urine incontinence    Physical Exam  /78   Pulse 73   Wt 75.7 kg (166 lb 12.8 oz)   BMI 25.37 kg/m²   General:    Well-appearing  F in no acute distress, well nourished, well hydrated  Head:  Normocephalic, atraumatic  Skin:          Warm dry,   Eyes:  Anicteric sclera, pupils equal,   Oral:      Not examined due to pandemic  Neck:   Supple  Cor:      Regular rate, normal S1, S2, no murmurs appreciated, no S3, no S4   Lungs:   Clear to auscultation b/l, no wheezes, no rhonchi, no crackles, no accessory respiratory muscle use  Abd:          Soft, nontender, no guarding    Assessment/Plan   Assessment & Plan  Type 2 diabetes mellitus without complication, without long-term current use of insulin (Multi)  A1C 7.8 in 8/2024  Continue annual diabetic eye exam (sees optho every 6 mos)  Buying own CGMs  Currently taking Metformin 1 gram bid, Glimepiride 4 mg (2 tabs) daily, and Tradjenta 5 mg daily  Discussed pharmacy team referral   Discussed Lantus insulin HS and willing to consider    Orders:    Albumin-Creatinine Ratio, Urine Random; Future    Hemoglobin A1C; Future    Comprehensive Metabolic Panel; Future    Referral to Clinical Pharmacy; Future    CBC; Future    Primary hypertension    Orders:    Comprehensive Metabolic Panel; Future    CBC;  Future    Hyperlipidemia, unspecified hyperlipidemia type    Orders:    Comprehensive Metabolic Panel; Future    History of Graves' disease    Orders:    Thyroid Stimulating Hormone; Future    UTI symptoms    Orders:    Urinalysis with Reflex Culture and Microscopic; Future     Exercising, watching her weight,   Sees gi regularly for colitis  Had shingrix  Had flu shot 11/2024  Plans to get Covid vaccine  Wants to get RSV  Recommend Covid vaccine  Tdap 11/2020  Prevnar 20 in 10/2022  Both shingrix     [facility]meter [facility]  One Touch Ultra II and Test strips one touch ultra strips  Has lancents  Use [facility] allan Woods, DO

## 2025-01-19 DIAGNOSIS — E11.9 TYPE 2 DIABETES MELLITUS WITHOUT COMPLICATION, WITHOUT LONG-TERM CURRENT USE OF INSULIN (MULTI): ICD-10-CM

## 2025-01-19 LAB — BACTERIA UR CULT: NO GROWTH

## 2025-01-21 RX ORDER — GLIMEPIRIDE 4 MG/1
TABLET ORAL
Qty: 180 TABLET | Refills: 0 | Status: SHIPPED | OUTPATIENT
Start: 2025-01-21

## 2025-01-23 ENCOUNTER — TELEMEDICINE (OUTPATIENT)
Dept: PHARMACY | Facility: HOSPITAL | Age: 65
End: 2025-01-23
Payer: COMMERCIAL

## 2025-01-23 DIAGNOSIS — E11.9 TYPE 2 DIABETES MELLITUS WITHOUT COMPLICATION, WITHOUT LONG-TERM CURRENT USE OF INSULIN (MULTI): Primary | ICD-10-CM

## 2025-01-23 PROCEDURE — RXMED WILLOW AMBULATORY MEDICATION CHARGE

## 2025-01-23 RX ORDER — TIRZEPATIDE 2.5 MG/.5ML
2.5 INJECTION, SOLUTION SUBCUTANEOUS WEEKLY
Qty: 2 ML | Refills: 3 | Status: SHIPPED | OUTPATIENT
Start: 2025-01-23

## 2025-01-23 RX ORDER — BLOOD-GLUCOSE,RECEIVER,CONT
EACH MISCELLANEOUS
Qty: 1 EACH | Refills: 0 | Status: SHIPPED | OUTPATIENT
Start: 2025-01-23

## 2025-01-23 RX ORDER — BLOOD-GLUCOSE SENSOR
EACH MISCELLANEOUS
Qty: 2 EACH | Refills: 11 | Status: SHIPPED | OUTPATIENT
Start: 2025-01-23

## 2025-01-23 NOTE — PROGRESS NOTES
Clinical Pharmacy Appointment    Patient ID: Trevon Vyas is a 64 y.o. female who presents for Diabetes.    Pt is here for First appointment.     Referring Provider: Brianna Woods DO      Subjective       HPI  DIABETES MELLITUS TYPE 2:    Diagnosed with diabetes:  NA. Known diabetic complications: NA.  Does patient follow with Endocrinology: No  Last optometry exam: NA  Most recent visit in Podiatry: NA-- patient denies sores or cuts on feet today      Current diabetic medications include:  Glimepiride 4 mg daily  Tradjenta 5 mg daily  Metformin 500mg 2 tabs BID    Clarifications to above regimen: NA  Adverse Effects: upset stomach with metformin.     Past diabetic medications include:  NA    Glucose Readings:  Glucometer/CGM Type: CGM  Patient tests BG continuous times per day    Current home BG readings (mg/dL): fasting (230s)   Previous home BG readings (mg/dL): NA    Any episodes of hypoglycemia? Yes,   .  Did patient treat episode of hypoglycemia appropriately? Yes, drank orange juice/peanut butter crackers  Does the patient have a prescription for ready-to-use Glucagon? Not on insulin  Does pt have proteinuria? No    Lifestyle:  Diet: 2 meals/day. Salads, fried fish, bread, green beans, baked potatoes  Physical Activity: 3x weekly (walks, goes to [facility], strength training)    Secondary Prevention:  Statin? Yes  ACE-I/ARB? Yes  Aspirin? Yes    Pertinent PMH Review:  PMH of Pancreatitis: No  PMH of Retinopathy: No  PMH of Urinary Tract Infections: No  PMH of MTC: No  UACR/EGFR in last year?: Yes        Medication Reconciliation:  Changed:   Added:   Discontinued:     Drug Interactions  No relevant drug interactions were noted.      Objective   Allergies   Allergen Reactions    Sulfa (Sulfonamide Antibiotics) Rash    Sulfur Rash     Social History     Social History Narrative    Not on file        Vitals  BP Readings from Last 2 Encounters:   01/17/25 128/78   08/09/24 120/74     BMI Readings from Last 1  Encounters:   25 25.37 kg/m²      Labs  A1C  Lab Results   Component Value Date    HGBA1C 8.3 (H) 2025    HGBA1C 7.8 (H) 2024    HGBA1C 7.3 (H) 2024     BMP  Lab Results   Component Value Date    CALCIUM 9.8 2025     2025    K 4.4 2025    CO2 27 2025     2025    BUN 11 2025    CREATININE 0.89 2025    EGFR 72 2025     LFTs  Lab Results   Component Value Date    ALT 18 2025    AST 24 2025    ALKPHOS 81 2025    BILITOT 0.5 2025     FLP  Lab Results   Component Value Date    TRIG 144 2024    CHOL 124 2024    LDLF 58 10/14/2022    LDLCALC 61 2024    HDL 33.9 2024     Urine Microalbumin  Lab Results   Component Value Date    MICROALBCREA  2025      Comment:      One or more analytes used in this calculation is outside of the analytical measurement range. Calculation cannot be performed.     Weight Management  Wt Readings from Last 3 Encounters:   25 75.7 kg (166 lb 12.8 oz)   24 73.9 kg (163 lb)   24 73 kg (161 lb)      There is no height or weight on file to calculate BMI.     Assessment/Plan   Problem List Items Addressed This Visit       Diabetes mellitus (Multi) - Primary     Patient's goal A1c is < 7%.  Is pt at goal? No, 8.3  Patient's SMBGs are fastins.     Rationale for plan: pt reports fasting BG to be in 230s. She uses freestyle onelia, sent request to connect. Will send rx to [facility] to bill Medicare part B plan (pt requested paperwork to be sent via email). Pt did report low BG (60s) if she doesn't have meals or carbs. Reviewed 15-minute rule. Pt reported upset stomach  with metformin if not taken with snack and meals. Due to increased A1c, uncontrolled BG, and pt showiing intolerance to metformin, will initiate mounjaro 2.5 mg weekly. Discussed possibility of discontinuing Glimepiride to prevent low BG. Reviewed MOUNA of mounjaro and pt aware she can  call clinical pharmacy at any time for any questions.     Medication Changes:  CONTINUE  Glimepiride 4mg daily  Metformin 500 mg 2 tabs BID  Linagliptin 5 mg daily  START  Mounjaro 2.5 mg Weekly  Future Considerations:  Will consider discontinuing glimepiride if clinically indicated.               Clinical Pharmacist follow-up: 2/27/25 240,    Continue all meds under the continuation of care with the referring provider and clinical pharmacy team.    Thank you,  Leah Alvarez PharmD [facility]  Clinical Pharmacy Resident, Primary Care     Verbal consent to manage patient's drug therapy was obtained from the patient. They were informed they may decline to participate or withdraw from participation in pharmacy services at any time.

## 2025-01-23 NOTE — ASSESSMENT & PLAN NOTE
Patient's goal A1c is < 7%.  Is pt at goal? No, 8.3  Patient's SMBGs are fastins.     Rationale for plan: pt reports fasting BG to be in 230s. She uses freestyle onelia, sent request to connect. Will send rx to [facility] to bill Medicare part B plan (pt requested paperwork to be sent via email). Pt did report low BG (60s) if she doesn't have meals or carbs. Reviewed 15-minute rule. Pt reported upset stomach  with metformin if not taken with snack and meals. Due to increased A1c, uncontrolled BG, and pt showiing intolerance to metformin, will initiate mounjaro 2.5 mg weekly. Discussed possibility of discontinuing Glimepiride to prevent low BG. Reviewed MOUNA of mounjaro and pt aware she can call clinical pharmacy at any time for any questions.     Medication Changes:  CONTINUE  Glimepiride 4mg daily  Metformin 500 mg 2 tabs BID  Linagliptin 5 mg daily  START  Mounjaro 2.5 mg Weekly  Future Considerations:  Will consider discontinuing glimepiride if clinically indicated. Will discontinue linagliptin when starting mounjaro 5mg.

## 2025-01-24 ENCOUNTER — TELEPHONE (OUTPATIENT)
Dept: PRIMARY CARE | Facility: CLINIC | Age: 65
End: 2025-01-24

## 2025-01-24 DIAGNOSIS — B33.8 RSV (RESPIRATORY SYNCYTIAL VIRUS INFECTION): Primary | ICD-10-CM

## 2025-01-24 DIAGNOSIS — E11.65 UNCONTROLLED TYPE 2 DIABETES MELLITUS WITH HYPERGLYCEMIA: ICD-10-CM

## 2025-01-24 RX ORDER — BENZONATATE 100 MG/1
100 CAPSULE ORAL 3 TIMES DAILY PRN
Qty: 21 CAPSULE | Refills: 0 | Status: SHIPPED | OUTPATIENT
Start: 2025-01-24 | End: 2025-02-14

## 2025-01-24 NOTE — TELEPHONE ENCOUNTER
Pt was recently diagnosed with RSV, she wants to know is there any medication she should be taking to relieve symptoms? Also she needs you to send glucose monitor and test strips to her [facility] pharmacy.

## 2025-01-26 RX ORDER — INSULIN PUMP SYRINGE, 3 ML
EACH MISCELLANEOUS
Qty: 1 EACH | Refills: 0 | Status: SHIPPED | OUTPATIENT
Start: 2025-01-26 | End: 2026-01-26

## 2025-01-27 ENCOUNTER — PHARMACY VISIT (OUTPATIENT)
Dept: PHARMACY | Facility: CLINIC | Age: 65
End: 2025-01-27
Payer: COMMERCIAL

## 2025-01-30 ENCOUNTER — APPOINTMENT (OUTPATIENT)
Dept: OPHTHALMOLOGY | Facility: CLINIC | Age: 65
End: 2025-01-30
Payer: COMMERCIAL

## 2025-01-30 DIAGNOSIS — H40.1131 PRIMARY OPEN ANGLE GLAUCOMA (POAG) OF BOTH EYES, MILD STAGE: Primary | ICD-10-CM

## 2025-01-30 DIAGNOSIS — H40.1130 PRIMARY OPEN ANGLE GLAUCOMA OF BOTH EYES, UNSPECIFIED GLAUCOMA STAGE: ICD-10-CM

## 2025-01-30 PROCEDURE — 92083 EXTENDED VISUAL FIELD XM: CPT | Performed by: OPHTHALMOLOGY

## 2025-01-30 PROCEDURE — 99214 OFFICE O/P EST MOD 30 MIN: CPT | Performed by: OPHTHALMOLOGY

## 2025-01-30 RX ORDER — BRIMONIDINE TARTRATE 2 MG/ML
1 SOLUTION/ DROPS OPHTHALMIC EVERY 12 HOURS
Qty: 30 ML | Refills: 3 | Status: SHIPPED | OUTPATIENT
Start: 2025-01-30

## 2025-01-30 RX ORDER — TRAVOPROST OPHTHALMIC SOLUTION 0.04 MG/ML
1 SOLUTION OPHTHALMIC NIGHTLY
Qty: 7.5 ML | Refills: 3 | Status: SHIPPED | OUTPATIENT
Start: 2025-01-30 | End: 2026-01-30

## 2025-01-30 ASSESSMENT — REFRACTION_WEARINGRX
OS_CYLINDER: -2.00
OS_ADD: +2.50
OD_CYLINDER: -2.50
OS_AXIS: 050
OD_ADD: +2.50
OD_SPHERE: -1.00
OS_SPHERE: -1.00
OD_AXIS: 090

## 2025-01-30 ASSESSMENT — CONF VISUAL FIELD
OD_INFERIOR_NASAL_RESTRICTION: 0
OS_INFERIOR_TEMPORAL_RESTRICTION: 0
OS_INFERIOR_NASAL_RESTRICTION: 0
OS_SUPERIOR_NASAL_RESTRICTION: 0
OS_NORMAL: 1
OD_INFERIOR_TEMPORAL_RESTRICTION: 0
OD_SUPERIOR_NASAL_RESTRICTION: 0
OD_SUPERIOR_TEMPORAL_RESTRICTION: 0
OD_NORMAL: 1
OS_SUPERIOR_TEMPORAL_RESTRICTION: 0

## 2025-01-30 ASSESSMENT — TONOMETRY
IOP_METHOD: GOLDMANN APPLANATION
OD_IOP_MMHG: 14
OS_IOP_MMHG: 14

## 2025-01-30 ASSESSMENT — VISUAL ACUITY
CORRECTION_TYPE: GLASSES
OS_CC: 20/25
METHOD: SNELLEN - LINEAR
OD_CC: 20/20
OD_CC+: -2

## 2025-01-30 ASSESSMENT — SLIT LAMP EXAM - LIDS
COMMENTS: GOOD POSITION
COMMENTS: GOOD POSITION

## 2025-01-30 ASSESSMENT — EXTERNAL EXAM - RIGHT EYE: OD_EXAM: NORMAL

## 2025-01-30 ASSESSMENT — EXTERNAL EXAM - LEFT EYE: OS_EXAM: NORMAL

## 2025-01-30 ASSESSMENT — PACHYMETRY
OD_CT(UM): 519
OS_CT(UM): 515

## 2025-01-30 NOTE — PROGRESS NOTES
1. NTG OU     - [place] (from [place], [place] dx`d with glaucoma 10+yrs ago, controlled wtih Travatan qhs ou. no h/o ocular or laser sx.      - Tmax < 20 per patient, /515, FH + (2 sisters and 1 brother), new tmax 7/14/22 21 OU     - No trauma, prolonged steroid use, cbd oil     - Gonio open 360  [facility] Visual [facility] - OU - Both Eyes          Normal OU          OCT, Optic Nerve - OU - Both Eyes          Right Eye  Images reviewed and comparison made to baseline.     Left Eye  Images reviewed and comparison made to baseline.     Notes  Mild thinning compared to baseline [place]          -S/P SLT ou   -IOP goal <15,  -NOW AT GOAL   - Continue travatan qhs OU   -continue brim BID OU  -rtc 6 months for DFE/OCT RNFL            2. Cataract OU     - Not visually significant     - Monitor created by:Miranda Sanders

## 2025-01-31 DIAGNOSIS — R19.7 DIARRHEA, UNSPECIFIED TYPE: ICD-10-CM

## 2025-02-03 RX ORDER — ELUXADOLINE 75 MG/1
75 TABLET, FILM COATED ORAL 2 TIMES DAILY
Qty: 180 TABLET | Refills: 0 | Status: SHIPPED | OUTPATIENT
Start: 2025-02-03

## 2025-02-09 ASSESSMENT — ENCOUNTER SYMPTOMS
DIARRHEA: 0
FEVER: 0
CHILLS: 0
HEMATURIA: 0
ABDOMINAL PAIN: 0
SORE THROAT: 0
DYSURIA: 0
VOMITING: 0
BACK PAIN: 0
ANOREXIA: 0
FREQUENCY: 0
CONSTIPATION: 0
HEADACHES: 0
FLANK PAIN: 0
NAUSEA: 0

## 2025-02-10 ENCOUNTER — APPOINTMENT (OUTPATIENT)
Dept: OBSTETRICS AND GYNECOLOGY | Facility: CLINIC | Age: 65
End: 2025-02-10
Payer: COMMERCIAL

## 2025-02-10 VITALS
BODY MASS INDEX: 24.55 KG/M2 | SYSTOLIC BLOOD PRESSURE: 120 MMHG | HEIGHT: 68 IN | WEIGHT: 162 LBS | DIASTOLIC BLOOD PRESSURE: 78 MMHG

## 2025-02-10 DIAGNOSIS — N89.8 VAGINAL DISCHARGE: ICD-10-CM

## 2025-02-10 DIAGNOSIS — N94.89 VAGINAL BURNING: Primary | ICD-10-CM

## 2025-02-10 DIAGNOSIS — I10 PRIMARY HYPERTENSION: ICD-10-CM

## 2025-02-10 DIAGNOSIS — N64.4 BREAST PAIN, RIGHT: ICD-10-CM

## 2025-02-10 DIAGNOSIS — N95.2 VAGINAL ATROPHY: ICD-10-CM

## 2025-02-10 PROCEDURE — 99203 OFFICE O/P NEW LOW 30 MIN: CPT | Performed by: OBSTETRICS & GYNECOLOGY

## 2025-02-10 ASSESSMENT — PATIENT HEALTH QUESTIONNAIRE - PHQ9
SUM OF ALL RESPONSES TO PHQ9 QUESTIONS 1 & 2: 0
2. FEELING DOWN, DEPRESSED OR HOPELESS: NOT AT ALL
1. LITTLE INTEREST OR PLEASURE IN DOING THINGS: NOT AT ALL

## 2025-02-10 ASSESSMENT — PAIN SCALES - GENERAL: PAINLEVEL_OUTOF10: 0-NO PAIN

## 2025-02-10 NOTE — PROGRESS NOTES
"SUBJECTIVE    64 y.o.  Hysterectomy female presents for   Chief Complaint   Patient presents with    Vaginal Itching     Vaginal itching clear discharge      Pt reports that about 2w ago she noted vaginal burning, mostly on the outside but can be on the inside.  Does have associated dryness (mild).  She does report a clear vaginal discharge with no odor.  These symptoms all started after taking antibiotics for a URI a few weeks ago.  Sexually active-- one partner.    Pt tried OTC Monistat last week with no improvement.      Also reports right breast \"achy-ness\" with moving her right arm. Last mammogram normal 3/2024 (repeat scheduled).  She has noticed it with all of her coughing; improved with massage.    Pt does have a h/o DM and hyperlipidemia.  Last Hgb A1c 8.3 in January.    OB/GYN History  No LMP recorded. Patient has had a hysterectomy.    Social History     Substance and Sexual Activity   Sexual Activity Yes    Partners: Male       OB History    Para Term  AB Living   0 0 0 0 0 0   SAB IAB Ectopic Multiple Live Births   0 0 0 0 0       Past Medical History  She has a past medical history of Essential (primary) hypertension, Personal history of other diseases of the digestive system (2020), Personal history of other endocrine, nutritional and metabolic disease (2020), Type 2 diabetes mellitus without complications (Multi) (2020), and Ulcerative colitis, unspecified, without complications (Multi) (2020).    Surgical History  She has a past surgical history that includes Hysterectomy ().     Social History  She reports that she has never smoked. She has never used smokeless tobacco. She reports that she does not drink alcohol and does not use drugs.    Medications:    Current Outpatient Medications:     aspirin 81 mg EC tablet, Take 1 tablet (81 mg) by mouth once daily., Disp: , Rfl:     atorvastatin (Lipitor) 40 mg tablet, Take 1 tablet (40 mg) by mouth once " daily., Disp: 90 tablet, Rfl: 1    benzonatate (Tessalon) 100 mg capsule, Take 1 capsule (100 mg) by mouth 3 times a day as needed for cough for up to 21 days. Swallow whole, Do not crush or chew., Disp: 21 capsule, Rfl: 0    Blood glucose monitoring meter, One Touch Glucometer Ultra II to test Glucose twice daily and as needed for hypoglycemia symptoms, Disp: 1 each, Rfl: 0    blood sugar diagnostic strip, 1 each 2 times a day. To use with One Touch Ultra II Glucometer, Disp: 100 each, Rfl: 1    brimonidine (AlphaGAN) 0.2 % ophthalmic solution, Administer 1 drop into both eyes every 12 hours., Disp: 30 mL, Rfl: 3    cholecalciferol, vitD3,/vit K2 (VITAMIN D3-VITAMIN K2 ORAL), Take 1 tablet by mouth once daily. 4000 units of Vitamin D3, Disp: , Rfl:     cyanocobalamin (Vitamin B-12) 1,000 mcg tablet, Take 100 mcg by mouth once daily., Disp: , Rfl:     eluxadoline (Viberzi) 75 mg tablet, Take 1 tablet (75 mg) by mouth 2 times a day., Disp: 180 tablet, Rfl: 0    FreeStyle Carlita 3 [place] misc, Use to measure blood glucose continuously. May dispense Carlita 3+., Disp: 1 each, Rfl: 0    FreeStyle Carlita 3 Sensor device, Use to measure blood glucose continuously., Disp: 2 each, Rfl: 11    glimepiride (Amaryl) 4 mg tablet, TAKE 2 TABLETS BY MOUTH ONCE  DAILY IN THE MORNING BEFORE A  MEAL, Disp: 180 tablet, Rfl: 0    linaGLIPtin (Tradjenta) 5 mg tablet, Take 1 tablet (5 mg) by mouth once daily., Disp: 90 tablet, Rfl: 0    lisinopril 20 mg tablet, Take 1 tablet (20 mg) by mouth once daily., Disp: 90 tablet, Rfl: 1    omega 3-dha-epa-fish-turmeric 417 mg-120 mg- 276 mg-600 mg capsule, Take 1 capsule by mouth once daily., Disp: , Rfl:     omeprazole (PriLOSEC) 40 mg DR capsule, TAKE 1 CAPSULE BY MOUTH ONCE  DAILY, Disp: 90 capsule, Rfl: 3    tirzepatide (Mounjaro) 2.5 mg/0.5 mL pen injector, Inject 2.5 mg under the skin 1 (one) time per week. Continue taking linagliptin while on Mounjaro 2.5mg., Disp: 2 mL, Rfl: 3    travoprost  "(Travatan Z) 0.004 % drops ophthalmic solution, Administer 1 drop into both eyes once daily at bedtime., Disp: 7.5 mL, Rfl: 3    metFORMIN (Glucophage) 500 mg tablet, Take 2 tablets (1,000 mg) by mouth 2 times daily (morning and late afternoon)., Disp: 360 tablet, Rfl: 1    Screenings  Social Drivers of Health     Tobacco Use: Low Risk  (2/10/2025)    Patient History     Smoking Tobacco Use: Never     Smokeless Tobacco Use: Never     Passive Exposure: Not on file   Alcohol Use: Not on file   Financial Resource Strain: Not on file   Food Insecurity: Not on file   Transportation Needs: Not on file   Physical Activity: Not on file   Stress: Not on file   Social Connections: Not on file   Intimate Partner Violence: Not on file   Depression: Not at risk (2/10/2025)    PHQ-2     PHQ-2 Score: 0   Housing Stability: Not on file   Utilities: Not on file   Digital Equity: Not on file   Health Literacy: Not on file         OBJECTIVE  Vitals:    02/10/25 0917   BP: 120/78   Weight: 73.5 kg (162 lb)   Height: 1.727 m (5' 8\")     Body mass index is 24.63 kg/m².     Chaperone: Present: yes  Physical Exam  Constitutional:       Appearance: Normal appearance.   Genitourinary:      No lesions in the vagina.      Right Labia: No rash, tenderness or lesions.     Left Labia: No tenderness, lesions or rash.     Vaginal discharge (scant, clear) present.      No vaginal erythema, bleeding or ulceration.      Moderate vaginal atrophy present.     Cervix is absent.      Uterus is absent.   Breasts:     Right: No inverted nipple, mass, nipple discharge or skin change.      Left: No inverted nipple, mass, nipple discharge or skin change.   Abdominal:      General: Abdomen is flat. There is no distension.      Tenderness: There is no abdominal tenderness.   Neurological:      Mental Status: She is alert.        ASSESSMENT & PLAN  Problem List Items Addressed This Visit       Breast pain, right     Other Visit Diagnoses       Vaginal burning    " -  Primary    Relevant Orders    Vaginitis Gram Stain For Bacterial Vaginosis + Yeast    Vaginal discharge        Relevant Orders    Vaginitis Gram Stain For Bacterial Vaginosis + Yeast    Vaginal atrophy                Follow up: Given prior antibiotic use will check vaginitis panel, although I suspect sx are from vaginal atrophy.  If panel negative pt does desire a trial of vaginal estrogen. Will send in once test results are back.    Jorge Santos MD  Obstetrics & Gynecology  02/10/25

## 2025-02-11 DIAGNOSIS — N95.2 VAGINAL ATROPHY: Primary | ICD-10-CM

## 2025-02-11 LAB — BV SCORE VAG QL: NORMAL

## 2025-02-11 RX ORDER — ESTRADIOL 10 UG/1
10 INSERT VAGINAL 2 TIMES WEEKLY
Qty: 8 TABLET | Refills: 11 | Status: SHIPPED | OUTPATIENT
Start: 2025-02-13

## 2025-02-11 NOTE — ASSESSMENT & PLAN NOTE
Previously received annual influenza vaccine 11/2024  Recommend Covid vaccine, plans to get  Discussed RSV vaccine Wants to get RSV  Prior Tdap 11/2020  Prior Prevnar 20 in 10/2022  Previously received both Shingrix vaccines  Exercising

## 2025-02-11 NOTE — ASSESSMENT & PLAN NOTE
BP at Goal <130/80  Continue lisinopril 20 mg daily  Labs ordered  Orders:    Comprehensive Metabolic Panel; Future    CBC; Future

## 2025-02-12 ENCOUNTER — PHARMACY VISIT (OUTPATIENT)
Dept: PHARMACY | Facility: CLINIC | Age: 65
End: 2025-02-12
Payer: COMMERCIAL

## 2025-02-12 DIAGNOSIS — I10 PRIMARY HYPERTENSION: ICD-10-CM

## 2025-02-12 PROCEDURE — RXMED WILLOW AMBULATORY MEDICATION CHARGE

## 2025-02-12 RX ORDER — LISINOPRIL 20 MG/1
20 TABLET ORAL DAILY
Qty: 90 TABLET | Refills: 1 | Status: SHIPPED | OUTPATIENT
Start: 2025-02-12

## 2025-02-13 RX ORDER — LISINOPRIL 20 MG/1
20 TABLET ORAL DAILY
Qty: 90 TABLET | Refills: 3 | OUTPATIENT
Start: 2025-02-13

## 2025-02-26 DIAGNOSIS — E11.9 TYPE 2 DIABETES MELLITUS WITHOUT COMPLICATION, WITHOUT LONG-TERM CURRENT USE OF INSULIN (MULTI): ICD-10-CM

## 2025-02-26 RX ORDER — BLOOD-GLUCOSE SENSOR
EACH MISCELLANEOUS
Qty: 2 EACH | Refills: 5 | Status: SHIPPED | OUTPATIENT
Start: 2025-02-26

## 2025-02-27 ENCOUNTER — APPOINTMENT (OUTPATIENT)
Dept: PHARMACY | Facility: HOSPITAL | Age: 65
End: 2025-02-27
Payer: COMMERCIAL

## 2025-02-27 DIAGNOSIS — E11.9 TYPE 2 DIABETES MELLITUS WITHOUT COMPLICATION, WITHOUT LONG-TERM CURRENT USE OF INSULIN (MULTI): ICD-10-CM

## 2025-02-27 NOTE — ASSESSMENT & PLAN NOTE
Patient's goal A1c is < 7%.  Is pt at goal? No, 8.3  Patient's SMBGs are 30 day avg 130.     Rationale for plan: pt reports improved BG since last visit. Tolerates mounjaro 5 mg and will plan to continue for glycemic control. Pt reported 14 hypo events, will discontinue glimepiride.     Medication Changes:  CONTINUE  Metformin 500mg 2 tabs BID  Mounjaro 5 mg weekly  STOP  Glimepiride 4 mg daily

## 2025-02-27 NOTE — PROGRESS NOTES
Clinical Pharmacy Appointment    Patient ID: Trevon Vyas is a 64 y.o. female who presents for Diabetes.    Pt is here for First appointment.     Referring Provider: Brianna Woods DO      Subjective       HPI  DIABETES MELLITUS TYPE 2:    Diagnosed with diabetes:  NA. Known diabetic complications: NA.  Does patient follow with Endocrinology: No  Last optometry exam: NA  Most recent visit in Podiatry: NA-- patient denies sores or cuts on feet today      Current diabetic medications include:  Glimepiride 4 mg daily  Metformin 500mg 2 tabs BID  Mounjaro 5 mg weekly    Clarifications to above regimen: NA  Adverse Effects: upset stomach with metformin.     Past diabetic medications include:  NA    Glucose Readings:  Glucometer/CGM Type: CGM  Patient tests BG continuous times per day    Current home BG readings (mg/dL): 30 day avg of 130. 14 hypo events  Previous home BG readings (mg/dL): NA    Any episodes of hypoglycemia? Yes,   .  Did patient treat episode of hypoglycemia appropriately? Yes, drank orange juice/peanut butter crackers  Does the patient have a prescription for ready-to-use Glucagon? Not on insulin  Does pt have proteinuria? No    Lifestyle:  Diet: 2 meals/day. Salads, fried fish, bread, green beans, baked potatoes  Physical Activity: 3x weekly (walks, [facility] to [facility], strength training)    Secondary Prevention:  Statin? Yes  ACE-I/ARB? Yes  Aspirin? Yes    Pertinent PMH Review:  PMH of Pancreatitis: No  PMH of Retinopathy: No  PMH of Urinary Tract Infections: No  PMH of MTC: No  UACR/EGFR in last year?: Yes        Medication Reconciliation:  Changed:   Added:   Discontinued:     Drug Interactions  No relevant drug interactions were noted.      Objective   Allergies   Allergen Reactions    Sulfa (Sulfonamide Antibiotics) Rash    Sulfur Rash     Social History     Social History Narrative    Not on file        Vitals  BP Readings from Last 2 Encounters:   02/10/25 120/78   01/17/25 128/78     BMI Readings  from Last 1 Encounters:   02/10/25 24.63 kg/m²      Labs  A1C  Lab Results   Component Value Date    HGBA1C 8.3 (H) 01/17/2025    HGBA1C 7.8 (H) 08/09/2024    HGBA1C 7.3 (H) 05/03/2024     BMP  Lab Results   Component Value Date    CALCIUM 9.8 01/17/2025     01/17/2025    K 4.4 01/17/2025    CO2 27 01/17/2025     01/17/2025    BUN 11 01/17/2025    CREATININE 0.89 01/17/2025    EGFR 72 01/17/2025     LFTs  Lab Results   Component Value Date    ALT 18 01/17/2025    AST 24 01/17/2025    ALKPHOS 81 01/17/2025    BILITOT 0.5 01/17/2025     FLP  Lab Results   Component Value Date    TRIG 144 08/09/2024    CHOL 124 08/09/2024    LDLF 58 10/14/2022    LDLCALC 61 08/09/2024    HDL 33.9 08/09/2024     Urine Microalbumin  Lab Results   Component Value Date    MICROALBCREA  01/17/2025      Comment:      One or more analytes used in this calculation is outside of the analytical measurement range. Calculation cannot be performed.     Weight Management  Wt Readings from Last 3 Encounters:   02/10/25 73.5 kg (162 lb)   01/17/25 75.7 kg (166 lb 12.8 oz)   08/09/24 73.9 kg (163 lb)      There is no height or weight on file to calculate BMI.     Assessment/Plan   Problem List Items Addressed This Visit       Diabetes mellitus (Multi)     Patient's goal A1c is < 7%.  Is pt at goal? No, 8.3  Patient's SMBGs are 30 day avg 130.     Rationale for plan: pt reports improved BG since last visit. Tolerates mounjaro 5 mg and will plan to continue for glycemic control. Pt reported 14 hypo events, will discontinue glimepiride.     Medication Changes:  CONTINUE  Metformin 500mg 2 tabs BID  Mounjaro 5 mg weekly  STOP  Glimepiride 4 mg daily              Clinical Pharmacist follow-up: 3/27/25 240,    Continue all meds under the continuation of care with the referring provider and clinical pharmacy team.    Thank you,  Leah Alvarez PharmD [facility]  Clinical Pharmacy Resident, Primary Care     Verbal consent to manage patient's drug therapy  was obtained from the patient. They were informed they may decline to participate or withdraw from participation in pharmacy services at any time.

## 2025-03-12 PROCEDURE — RXMED WILLOW AMBULATORY MEDICATION CHARGE

## 2025-03-18 ENCOUNTER — PHARMACY VISIT (OUTPATIENT)
Dept: PHARMACY | Facility: CLINIC | Age: 65
End: 2025-03-18
Payer: COMMERCIAL

## 2025-03-23 DIAGNOSIS — E11.9 TYPE 2 DIABETES MELLITUS WITHOUT COMPLICATION, WITHOUT LONG-TERM CURRENT USE OF INSULIN: ICD-10-CM

## 2025-03-24 RX ORDER — METFORMIN HYDROCHLORIDE 500 MG/1
1000 TABLET ORAL 2 TIMES DAILY
Qty: 360 TABLET | Refills: 1 | Status: SHIPPED | OUTPATIENT
Start: 2025-03-24 | End: 2025-03-27 | Stop reason: ALTCHOICE

## 2025-03-27 ENCOUNTER — APPOINTMENT (OUTPATIENT)
Dept: PHARMACY | Facility: HOSPITAL | Age: 65
End: 2025-03-27
Payer: COMMERCIAL

## 2025-03-27 DIAGNOSIS — E11.9 TYPE 2 DIABETES MELLITUS WITHOUT COMPLICATION, WITHOUT LONG-TERM CURRENT USE OF INSULIN: ICD-10-CM

## 2025-03-27 NOTE — PROGRESS NOTES
Clinical Pharmacy Appointment    Patient ID: Trevon Vyas is a 64 y.o. female who presents for Diabetes.    Pt is here for Follow Up appointment.     Referring Provider: Brianna Woods DO      Subjective       HPI  DIABETES MELLITUS TYPE 2:    Diagnosed with diabetes:  NA. Known diabetic complications: NA.  Does patient follow with Endocrinology: No  Last optometry exam: NA  Most recent visit in Podiatry: NA-- patient denies sores or cuts on feet today      Current diabetic medications include:  Metformin 500mg 2 tabs BID  Mounjaro 5 mg weekly    Clarifications to above regimen: pt does not take metformin  Adverse Effects: NA    Past diabetic medications include:  Metformin- upset stomach    Glucose Readings:  Glucometer/CGM Type: CGM  Patient tests BG continuous times per day    Current home BG readings (mg/dL): 14 day avg of 149, 84% time in range. 30 day avg- 146  Previous home BG readings (mg/dL): NA    Any episodes of hypoglycemia? Yes,   .  Did patient treat episode of hypoglycemia appropriately? Yes, drank orange juice/peanut butter crackers  Does the patient have a prescription for ready-to-use Glucagon? Not on insulin  Does pt have proteinuria? No    Lifestyle:  Diet: 2 meals/day. Salads, fried fish, bread, green beans, baked potatoes  Physical Activity: 3x weekly (walks, goes to [facility], strength training)    Secondary Prevention:  Statin? Yes  ACE-I/ARB? Yes  Aspirin? Yes    Pertinent PMH Review:  PMH of Pancreatitis: No  PMH of Retinopathy: No  PMH of Urinary Tract Infections: No  PMH of MTC: No  UACR/EGFR in last year?: Yes        Medication Reconciliation:  Changed:   Added:   Discontinued:     Drug Interactions  No relevant drug interactions were noted.      Objective   Allergies   Allergen Reactions    Sulfa (Sulfonamide Antibiotics) Rash    Sulfur Rash     Social History     Social History Narrative    Not on file        Vitals  BP Readings from Last 2 Encounters:   02/10/25 120/78   01/17/25 128/78      BMI Readings from Last 1 Encounters:   02/10/25 24.63 kg/m²      Labs  A1C  Lab Results   Component Value Date    HGBA1C 8.3 (H) 01/17/2025    HGBA1C 7.8 (H) 08/09/2024    HGBA1C 7.3 (H) 05/03/2024     BMP  Lab Results   Component Value Date    CALCIUM 9.8 01/17/2025     01/17/2025    K 4.4 01/17/2025    CO2 27 01/17/2025     01/17/2025    BUN 11 01/17/2025    CREATININE 0.89 01/17/2025    EGFR 72 01/17/2025     LFTs  Lab Results   Component Value Date    ALT 18 01/17/2025    AST 24 01/17/2025    ALKPHOS 81 01/17/2025    BILITOT 0.5 01/17/2025     FLP  Lab Results   Component Value Date    TRIG 144 08/09/2024    CHOL 124 08/09/2024    LDLF 58 10/14/2022    LDLCALC 61 08/09/2024    HDL 33.9 08/09/2024     Urine Microalbumin  Lab Results   Component Value Date    MICROALBCREA  01/17/2025      Comment:      One or more analytes used in this calculation is outside of the analytical measurement range. Calculation cannot be performed.     Weight Management  Wt Readings from Last 3 Encounters:   02/10/25 73.5 kg (162 lb)   01/17/25 75.7 kg (166 lb 12.8 oz)   08/09/24 73.9 kg (163 lb)      There is no height or weight on file to calculate BMI.     Assessment/Plan   Problem List Items Addressed This Visit       Diabetes mellitus (Multi)     Patient's goal A1c is < 7%.  Is pt at goal? No, 8.3  Patient's SMBGs are 14 day avg of 149, 84% time in range. 30 day avg- 146.     Rationale for plan: pt reported increase in BG from previous visit and only 2 low readings. Pt has not been taking metformin for past 2 weeks. Pt also admitted to not taking metformin, has only been using mounjaro 5 mg. Pt tolerated mounjaro well, expressed concerns for weigh loss. Pt has also been snacking late at night, which has been the main cause of increase in BG. Pt will stop and increase life style modifications. Plan to continue on mounjaro only, will reinitiate metformin if BG is not controlled by next visit.     Medication  Changes:  CONTINUE  Mounjaro 5 mg weekly  STOP  Metformin 500mg 2 tabs BID                Clinical Pharmacist follow-up: 2/24/25 240,    Continue all meds under the continuation of care with the referring provider and clinical pharmacy team.    Thank you,  Leah Alvarez PharmD [facility]  Clinical Pharmacy Resident, Primary Care     Verbal consent to manage patient's drug therapy was obtained from the patient. They were informed they may decline to participate or withdraw from participation in pharmacy services at any time.

## 2025-03-27 NOTE — ASSESSMENT & PLAN NOTE
Patient's goal A1c is < 7%.  Is pt at goal? No, 8.3  Patient's SMBGs are 14 day avg of 149, 84% time in range. 30 day avg- 146.     Rationale for plan: pt reported increase in BG from previous visit and only 2 low readings. Pt has not been taking metformin for past 2 weeks. Pt also admitted to not taking metformin, has only been using mounjaro 5 mg. Pt tolerated mounjaro well, expressed concerns for weigh loss. Pt has also been snacking late at night, which has been the main cause of increase in BG. Pt will stop and increase life style modifications. Plan to continue on mounjaro only, will reinitiate metformin if BG is not controlled by next visit.     Medication Changes:  CONTINUE  Mounjaro 5 mg weekly  STOP  Metformin 500mg 2 tabs BID

## 2025-03-31 ENCOUNTER — HOSPITAL ENCOUNTER (OUTPATIENT)
Dept: RADIOLOGY | Facility: CLINIC | Age: 65
Discharge: HOME | End: 2025-03-31
Payer: COMMERCIAL

## 2025-03-31 VITALS — BODY MASS INDEX: 24.56 KG/M2 | HEIGHT: 68 IN | WEIGHT: 162.04 LBS

## 2025-03-31 DIAGNOSIS — Z12.31 ENCOUNTER FOR SCREENING MAMMOGRAM FOR MALIGNANT NEOPLASM OF BREAST: ICD-10-CM

## 2025-03-31 PROCEDURE — 77067 SCR MAMMO BI INCL CAD: CPT | Performed by: RADIOLOGY

## 2025-03-31 PROCEDURE — 77063 BREAST TOMOSYNTHESIS BI: CPT | Performed by: RADIOLOGY

## 2025-03-31 PROCEDURE — 77063 BREAST TOMOSYNTHESIS BI: CPT

## 2025-04-15 PROCEDURE — RXMED WILLOW AMBULATORY MEDICATION CHARGE

## 2025-04-17 ENCOUNTER — PHARMACY VISIT (OUTPATIENT)
Dept: PHARMACY | Facility: CLINIC | Age: 65
End: 2025-04-17
Payer: COMMERCIAL

## 2025-04-24 ENCOUNTER — APPOINTMENT (OUTPATIENT)
Dept: PHARMACY | Facility: HOSPITAL | Age: 65
End: 2025-04-24
Payer: COMMERCIAL

## 2025-04-24 VITALS — BODY MASS INDEX: 23.57 KG/M2 | WEIGHT: 155 LBS

## 2025-04-24 DIAGNOSIS — E11.9 TYPE 2 DIABETES MELLITUS WITHOUT COMPLICATION, WITHOUT LONG-TERM CURRENT USE OF INSULIN: ICD-10-CM

## 2025-04-24 RX ORDER — TIRZEPATIDE 5 MG/.5ML
5 INJECTION, SOLUTION SUBCUTANEOUS WEEKLY
Qty: 2 ML | Refills: 6 | Status: SHIPPED | OUTPATIENT
Start: 2025-04-24

## 2025-04-24 NOTE — ASSESSMENT & PLAN NOTE
Patient's goal A1c is < 7%.  Is pt at goal? No, 8.3  Patient's SMBGs are 4 day avg of 119, 96% time in range. 30 day avg- 120.     Rationale for plan: pt reports controlled BG. Tolerated mounjaro well. Will plan to continue current dose to prevent further weight loss. Pt to continue diet and exercise. Pt to complete pending A1c.    Medication Changes:  CONTINUE  Mounjaro 5 mg weekly

## 2025-04-24 NOTE — PROGRESS NOTES
Clinical Pharmacy Appointment    Patient ID: Trevon Vyas is a 65 y.o. female who presents for Diabetes.    Pt is here for Follow Up appointment.     Referring Provider: Brianna Woods DO      Subjective       HPI  DIABETES MELLITUS TYPE 2:    Diagnosed with diabetes:  NA. Known diabetic complications: NA.  Does patient follow with Endocrinology: No  Last optometry exam: NA  Most recent visit in Podiatry: NA-- patient denies sores or cuts on feet today      Current diabetic medications include:  Mounjaro 5 mg weekly    Clarifications to above regimen:  NA  Adverse Effects: NA    Past diabetic medications include:  Metformin- upset stomach    Glucose Readings:  Glucometer/CGM Type: CGM  Patient tests BG continuous times per day    Current home BG readings (mg/dL): 14 day avg of 119, 96% time in range. 30 day avg- 120    Any episodes of hypoglycemia? Yes,   .  Did patient treat episode of hypoglycemia appropriately? Yes, drank orange juice/peanut butter crackers  Does the patient have a prescription for ready-to-use Glucagon? Not on insulin  Does pt have proteinuria? No    Lifestyle:  Diet: 2 meals/day. Salads, fried fish, bread, green beans, baked potatoes  Physical Activity: 3x weekly (walks, [facility] to [facility], strength training)    Secondary Prevention:  Statin? Yes  ACE-I/ARB? Yes  Aspirin? Yes    Pertinent PMH Review:  PMH of Pancreatitis: No  PMH of Retinopathy: No  PMH of Urinary Tract Infections: No  PMH of MTC: No  UACR/EGFR in last year?: Yes        Medication Reconciliation:  Changed:   Added:   Discontinued:     Drug Interactions  No relevant drug interactions were noted.      Objective   Allergies   Allergen Reactions    Sulfa (Sulfonamide Antibiotics) Rash    Sulfur Rash     Social History     Social History Narrative    Not on file        Vitals  BP Readings from Last 2 Encounters:   02/10/25 120/78   01/17/25 128/78     BMI Readings from Last 1 Encounters:   04/24/25 23.57 kg/m²      Labs  A1C  Lab  Results   Component Value Date    HGBA1C 8.3 (H) 01/17/2025    HGBA1C 7.8 (H) 08/09/2024    HGBA1C 7.3 (H) 05/03/2024     BMP  Lab Results   Component Value Date    CALCIUM 9.8 01/17/2025     01/17/2025    K 4.4 01/17/2025    CO2 27 01/17/2025     01/17/2025    BUN 11 01/17/2025    CREATININE 0.89 01/17/2025    EGFR 72 01/17/2025     LFTs  Lab Results   Component Value Date    ALT 18 01/17/2025    AST 24 01/17/2025    ALKPHOS 81 01/17/2025    BILITOT 0.5 01/17/2025     FLP  Lab Results   Component Value Date    TRIG 144 08/09/2024    CHOL 124 08/09/2024    LDLF 58 10/14/2022    LDLCALC 61 08/09/2024    HDL 33.9 08/09/2024     Urine Microalbumin  Lab Results   Component Value Date    MICROALBCREA  01/17/2025      Comment:      One or more analytes used in this calculation is outside of the analytical measurement range. Calculation cannot be performed.     Weight Management  Wt Readings from Last 3 Encounters:   04/24/25 70.3 kg (155 lb)   03/31/25 73.5 kg (162 lb 0.6 oz)   02/10/25 73.5 kg (162 lb)      Body mass index is 23.57 kg/m².     Assessment/Plan   Problem List Items Addressed This Visit       Diabetes mellitus (Multi)    Patient's goal A1c is < 7%.  Is pt at goal? No, 8.3  Patient's SMBGs are 4 day avg of 119, 96% time in range. 30 day avg- 120.     Rationale for plan: pt reports controlled BG. Tolerated mounjaro well. Will plan to continue current dose to prevent further weight loss. Pt to continue diet and exercise. Pt to complete pending A1c.    Medication Changes:  CONTINUE  Mounjaro 5 mg weekly                  Clinical Pharmacist follow-up: 6/19/25 240,    Continue all meds under the continuation of care with the referring provider and clinical pharmacy team.    Thank you,  Leah Alvarez PharmD [facility]  Clinical Pharmacy Resident, Primary Care     Verbal consent to manage patient's drug therapy was obtained from the patient. They were informed they may decline to participate or withdraw from  participation in pharmacy services at any time.

## 2025-05-06 PROCEDURE — RXMED WILLOW AMBULATORY MEDICATION CHARGE

## 2025-05-07 ENCOUNTER — PHARMACY VISIT (OUTPATIENT)
Dept: PHARMACY | Facility: CLINIC | Age: 65
End: 2025-05-07
Payer: COMMERCIAL

## 2025-05-12 ENCOUNTER — APPOINTMENT (OUTPATIENT)
Dept: OBSTETRICS AND GYNECOLOGY | Facility: CLINIC | Age: 65
End: 2025-05-12
Payer: COMMERCIAL

## 2025-05-13 ENCOUNTER — APPOINTMENT (OUTPATIENT)
Facility: CLINIC | Age: 65
End: 2025-05-13
Payer: COMMERCIAL

## 2025-05-13 VITALS
SYSTOLIC BLOOD PRESSURE: 133 MMHG | BODY MASS INDEX: 24.8 KG/M2 | WEIGHT: 158 LBS | HEIGHT: 67 IN | DIASTOLIC BLOOD PRESSURE: 83 MMHG

## 2025-05-13 DIAGNOSIS — N95.2 VAGINAL ATROPHY: Primary | ICD-10-CM

## 2025-05-13 DIAGNOSIS — N64.4 BREAST PAIN, RIGHT: ICD-10-CM

## 2025-05-13 PROCEDURE — 3008F BODY MASS INDEX DOCD: CPT | Performed by: OBSTETRICS & GYNECOLOGY

## 2025-05-13 PROCEDURE — 3075F SYST BP GE 130 - 139MM HG: CPT | Performed by: OBSTETRICS & GYNECOLOGY

## 2025-05-13 PROCEDURE — 99459 PELVIC EXAMINATION: CPT | Performed by: OBSTETRICS & GYNECOLOGY

## 2025-05-13 PROCEDURE — 1159F MED LIST DOCD IN RCRD: CPT | Performed by: OBSTETRICS & GYNECOLOGY

## 2025-05-13 PROCEDURE — 1160F RVW MEDS BY RX/DR IN RCRD: CPT | Performed by: OBSTETRICS & GYNECOLOGY

## 2025-05-13 PROCEDURE — 4010F ACE/ARB THERAPY RXD/TAKEN: CPT | Performed by: OBSTETRICS & GYNECOLOGY

## 2025-05-13 PROCEDURE — 3052F HG A1C>EQUAL 8.0%<EQUAL 9.0%: CPT | Performed by: OBSTETRICS & GYNECOLOGY

## 2025-05-13 PROCEDURE — 3079F DIAST BP 80-89 MM HG: CPT | Performed by: OBSTETRICS & GYNECOLOGY

## 2025-05-13 PROCEDURE — 3062F POS MACROALBUMINURIA REV: CPT | Performed by: OBSTETRICS & GYNECOLOGY

## 2025-05-13 PROCEDURE — 1036F TOBACCO NON-USER: CPT | Performed by: OBSTETRICS & GYNECOLOGY

## 2025-05-13 PROCEDURE — 99213 OFFICE O/P EST LOW 20 MIN: CPT | Performed by: OBSTETRICS & GYNECOLOGY

## 2025-05-13 ASSESSMENT — PATIENT HEALTH QUESTIONNAIRE - PHQ9
2. FEELING DOWN, DEPRESSED OR HOPELESS: NOT AT ALL
SUM OF ALL RESPONSES TO PHQ9 QUESTIONS 1 & 2: 0
1. LITTLE INTEREST OR PLEASURE IN DOING THINGS: NOT AT ALL

## 2025-05-13 NOTE — PROGRESS NOTES
SUBJECTIVE    65 y.o.  Hysterectomy female presents for   Chief Complaint   Patient presents with    Breast Problem     Breast problem right side      Pt presents for follow up. She was seen ~3 months ago for vaginal pain and burning. At the time her exam was c/w vaginal atrophy.  She had a vaginitis panel that was indeterminate, so we opted to trial vaginal estrogen with vagifem.    Today she reports she is feeling much better.  The irritation and burning has resolved.  However, she does report aching in her right breast.  She can feel a poking sensation when she puts her bra on.  She purchased a new bra and this did not make any difference.  She has not felt a mass.  She has had breast exams and mammograms in the past that have been normal.    Sjhe did have a mammogram 3/2025 that was normal.    OB/GYN History  No LMP recorded. Patient has had a hysterectomy.    Social History     Substance and Sexual Activity   Sexual Activity Yes    Partners: Male       OB History    Para Term  AB Living   1    1    SAB IAB Ectopic Multiple Live Births   1          # Outcome Date GA Lbr Jey/2nd Weight Sex Type Anes PTL Lv   1 SAB                Past Medical History  She has a past medical history of Disease of thyroid gland (), Essential (primary) hypertension, Personal history of other diseases of the digestive system (2020), Personal history of other endocrine, nutritional and metabolic disease (2020), Type 2 diabetes mellitus without complications (2020), and Ulcerative colitis, unspecified, without complications (Multi) (2020).    Surgical History  She has a past surgical history that includes Hysterectomy () and Oophorectomy ().     Social History  She reports that she has never smoked. She has never used smokeless tobacco. She reports that she does not drink alcohol and does not use drugs.    Medications:  Current Medications[1]    Screenings  Social Drivers of  "Health     Tobacco Use: Low Risk  (5/13/2025)    Patient History     Smoking Tobacco Use: Never     Smokeless Tobacco Use: Never     Passive Exposure: Not on file   Alcohol Use: Not on file   Financial Resource Strain: Not on file   Food Insecurity: Not on file   Transportation Needs: Not on file   Physical Activity: Not on file   Stress: Not on file   Social Connections: Not on file   Intimate Partner Violence: Not on file   Depression: Not at risk (5/13/2025)    PHQ-2     PHQ-2 Score: 0   Housing Stability: Not on file   Utilities: Not on file   Digital Equity: Not on file   Health Literacy: Not on file         OBJECTIVE  Vitals:    05/13/25 0905   BP: 133/83   Weight: 71.7 kg (158 lb)   Height: 1.702 m (5' 7\")     Body mass index is 24.75 kg/m².     Chaperone: Present: yes  Physical Exam  Genitourinary:   Breasts:     Right: No inverted nipple, mass, nipple discharge or skin change.      Left: No inverted nipple, mass, nipple discharge or skin change.        ASSESSMENT & PLAN  Problem List Items Addressed This Visit       Breast pain, right     Other Visit Diagnoses         Vaginal atrophy    -  Primary            Follow up: Continue vaginal estrogen. Discussed normal breast findings. Pt does not usually have a bra fitting-- encouraged her to be measured/fit as ill-fitting bras can contribute to breast pain. RTO if symptoms worsen or for routine care.    Jorge aSntos MD  Obstetrics & Gynecology  05/13/25       [1]   Current Outpatient Medications:     aspirin 81 mg EC tablet, Take 1 tablet (81 mg) by mouth once daily., Disp: , Rfl:     atorvastatin (Lipitor) 40 mg tablet, Take 1 tablet (40 mg) by mouth once daily., Disp: 90 tablet, Rfl: 1    Blood glucose monitoring meter, One Touch Glucometer Ultra II to test Glucose twice daily and as needed for hypoglycemia symptoms, Disp: 1 each, Rfl: 0    blood sugar diagnostic strip, 1 each 2 times a day. To use with One Touch Ultra II Glucometer, Disp: 100 each, Rfl: 1   "  blood-glucose sensor (FreeStyle Carlita 3 Plus Sensor) device, Use to monitor blood sugar and change every 15 days, Disp: 2 each, Rfl: 5    brimonidine (AlphaGAN) 0.2 % ophthalmic solution, Administer 1 drop into both eyes every 12 hours., Disp: 30 mL, Rfl: 3    cholecalciferol, vitD3,/vit K2 (VITAMIN D3-VITAMIN K2 ORAL), Take 1 tablet by mouth once daily. 4000 units of Vitamin D3, Disp: , Rfl:     cyanocobalamin (Vitamin B-12) 1,000 mcg tablet, Take 100 mcg by mouth once daily., Disp: , Rfl:     eluxadoline (Viberzi) 75 mg tablet, Take 1 tablet (75 mg) by mouth 2 times a day., Disp: 180 tablet, Rfl: 0    estradiol (Vagifem) 10 mcg tablet vaginal tablet, Insert 1 tablet (10 mcg) into the vagina 2 times a week., Disp: 8 tablet, Rfl: 11    FreeStyle Carlita 3 Boyers misc, Use to measure blood glucose continuously. May dispense Carlita 3+., Disp: 1 each, Rfl: 0    FreeStyle Carlita 3 Sensor device, Use to measure blood glucose continuously., Disp: 2 each, Rfl: 11    lisinopril 20 mg tablet, Take 1 tablet (20 mg) by mouth once daily., Disp: 90 tablet, Rfl: 1    omega 3-dha-epa-fish-turmeric 417 mg-120 mg- 276 mg-600 mg capsule, Take 1 capsule by mouth once daily., Disp: , Rfl:     omeprazole (PriLOSEC) 40 mg DR capsule, TAKE 1 CAPSULE BY MOUTH ONCE  DAILY, Disp: 90 capsule, Rfl: 3    tirzepatide (Mounjaro) 5 mg/0.5 mL pen injector, Inject 5 mg under the skin 1 (one) time per week., Disp: 2 mL, Rfl: 6    travoprost (Travatan Z) 0.004 % drops ophthalmic solution, Administer 1 drop into both eyes once daily at bedtime., Disp: 7.5 mL, Rfl: 3

## 2025-06-10 PROCEDURE — RXMED WILLOW AMBULATORY MEDICATION CHARGE

## 2025-06-12 ENCOUNTER — PHARMACY VISIT (OUTPATIENT)
Dept: PHARMACY | Facility: CLINIC | Age: 65
End: 2025-06-12
Payer: COMMERCIAL

## 2025-06-19 ENCOUNTER — APPOINTMENT (OUTPATIENT)
Dept: PHARMACY | Facility: HOSPITAL | Age: 65
End: 2025-06-19
Payer: COMMERCIAL

## 2025-06-19 DIAGNOSIS — E11.9 TYPE 2 DIABETES MELLITUS WITHOUT COMPLICATION, WITHOUT LONG-TERM CURRENT USE OF INSULIN: ICD-10-CM

## 2025-06-19 NOTE — PROGRESS NOTES
Clinical Pharmacy Appointment    Patient ID: Trevon Vyas is a 65 y.o. female who presents for Diabetes.    Pt is here for Follow Up appointment.     Referring Provider: Brianna Woods DO      Subjective       HPI  DIABETES MELLITUS TYPE 2:    Diagnosed with diabetes:  NA. Known diabetic complications: NA.  Does patient follow with Endocrinology: No  Last optometry exam: NA  Most recent visit in Podiatry: NA-- patient denies sores or cuts on feet today      Current diabetic medications include:  Mounjaro 5 mg weekly    Clarifications to above regimen:  NA  Adverse Effects: NA    Past diabetic medications include:  Metformin- upset stomach  Glimepiride    Glucose Readings:  Glucometer/CGM Type: CGM  Patient tests BG continuous times per day    Current home BG readings (mg/dL): 14 day avg of 143, 89% time in range. 30 day avg- 143     Previous BG readings: 14 day avg of 119, 96% time in range. 30 day avg- 120     Any episodes of hypoglycemia? Yes,  .  Did patient treat episode of hypoglycemia appropriately? Yes, drank orange juice/peanut butter crackers  Does the patient have a prescription for ready-to-use Glucagon? Not on insulin  Does pt have proteinuria? No    Lifestyle:  Diet: 2 meals/day. Salads, fried fish, bread, green beans, baked potatoes  Physical Activity: 3x weekly (walks, goes to [facility], strength training)    Secondary Prevention:  Statin? Yes  ACE-I/ARB? Yes  Aspirin? Yes    Pertinent PMH Review:  PMH of Pancreatitis: No  PMH of Retinopathy: No  PMH of Urinary Tract Infections: No  PMH of MTC: No  UACR/EGFR in last year?: Yes        Medication Reconciliation:  Changed:   Added:   Discontinued:     Drug Interactions  No relevant drug interactions were noted.      Objective   Allergies   Allergen Reactions    Sulfa (Sulfonamide Antibiotics) Rash    Sulfur Rash     Social History     Social History Narrative    Not on file        Vitals  BP Readings from Last 2 Encounters:   05/13/25 133/83   02/10/25  120/78     BMI Readings from Last 1 Encounters:   05/13/25 24.75 kg/m²      Labs  A1C  Lab Results   Component Value Date    HGBA1C 8.3 (H) 01/17/2025    HGBA1C 7.8 (H) 08/09/2024    HGBA1C 7.3 (H) 05/03/2024     BMP  Lab Results   Component Value Date    CALCIUM 9.8 01/17/2025     01/17/2025    K 4.4 01/17/2025    CO2 27 01/17/2025     01/17/2025    BUN 11 01/17/2025    CREATININE 0.89 01/17/2025    EGFR 72 01/17/2025     LFTs  Lab Results   Component Value Date    ALT 18 01/17/2025    AST 24 01/17/2025    ALKPHOS 81 01/17/2025    BILITOT 0.5 01/17/2025     FLP  Lab Results   Component Value Date    TRIG 144 08/09/2024    CHOL 124 08/09/2024    LDLF 58 10/14/2022    LDLCALC 61 08/09/2024    HDL 33.9 08/09/2024     Urine Microalbumin  Lab Results   Component Value Date    MICROALBCREA  01/17/2025      Comment:      One or more analytes used in this calculation is outside of the analytical measurement range. Calculation cannot be performed.     Weight Management  Wt Readings from Last 3 Encounters:   05/13/25 71.7 kg (158 lb)   04/24/25 70.3 kg (155 lb)   03/31/25 73.5 kg (162 lb 0.6 oz)      There is no height or weight on file to calculate BMI.     Assessment/Plan   Problem List Items Addressed This Visit       Diabetes mellitus (Multi)    Patient's goal A1c is < 7%.  Is pt at goal? No, 8.3  Patient's SMBGs are  14 day avg of 143, 89% time in range. 30 day avg- 143 .     Rationale for plan: pt reported uncontrolled and increased BG compared to last visit. Pt states most likely due to increased fruit within diet. Tries to stay away from carbs. Discussed a higher fiber, protein, and low carb diet. Will plan to start Jardiance 10 mg daily (discussed MOA, ADEs, Benefits) and not increase mounjaro due to pt not being overweight. Pt to complete labs prior to next appt.     Medication Changes:  CONTINUE  Mounjaro 5 mg weekly  START  Jardiance 10 mg daily    Future Considerations:  Consider titration of  Jardiance over mounjaro. Pt does not want to lose weight. Pt ok with mounjaro 5 mg.          Relevant Medications    empagliflozin (Jardiance) 10 mg tablet    Other Relevant Orders    Comprehensive metabolic panel    Lipid panel    Referral to Clinical Pharmacy    Albumin-Creatinine Ratio, Urine Random      Clinical Pharmacist follow-up:  7/24/25 240,    Continue all meds under the continuation of care with the referring provider and clinical pharmacy team.    Thank you,  Leah Alvarez PharmD [facility]  Clinical Pharmacy Resident, Primary Care     Verbal consent to manage patient's drug therapy was obtained from the patient. They were informed they may decline to participate or withdraw from participation in pharmacy services at any time.

## 2025-06-19 NOTE — ASSESSMENT & PLAN NOTE
Patient's goal A1c is < 7%.  Is pt at goal? No, 8.3  Patient's SMBGs are  14 day avg of 143, 89% time in range. 30 day avg- 143 .     Rationale for plan: pt reported uncontrolled and increased BG compared to last visit. Pt states most likely due to increased fruit within diet. Tries to stay away from carbs. Discussed a higher fiber, protein, and low carb diet. Will plan to start Jardiance 10 mg daily (discussed MOA, ADEs, Benefits) and not increase mounjaro due to pt not being overweight. Pt to complete labs prior to next appt.     Medication Changes:  CONTINUE  Mounjaro 5 mg weekly  START  Jardiance 10 mg daily    Future Considerations:  Consider titration of Jardiance over mounjaro. Pt does not want to lose weight. Pt ok with mounjaro 5 mg.

## 2025-06-24 LAB
ALBUMIN SERPL-MCNC: 4.4 G/DL (ref 3.6–5.1)
ALBUMIN/CREAT UR: NORMAL MG/G CREAT
ALP SERPL-CCNC: 96 U/L (ref 37–153)
ALT SERPL-CCNC: 20 U/L (ref 6–29)
ANION GAP SERPL CALCULATED.4IONS-SCNC: 7 MMOL/L (CALC) (ref 7–17)
AST SERPL-CCNC: 33 U/L (ref 10–35)
BILIRUB SERPL-MCNC: 0.6 MG/DL (ref 0.2–1.2)
BUN SERPL-MCNC: 14 MG/DL (ref 7–25)
CALCIUM SERPL-MCNC: 9.7 MG/DL (ref 8.6–10.4)
CHLORIDE SERPL-SCNC: 106 MMOL/L (ref 98–110)
CHOLEST SERPL-MCNC: 141 MG/DL
CHOLEST/HDLC SERPL: 3.4 (CALC)
CO2 SERPL-SCNC: 25 MMOL/L (ref 20–32)
CREAT SERPL-MCNC: 0.93 MG/DL (ref 0.5–1.05)
CREAT UR-MCNC: 47 MG/DL (ref 20–275)
EGFRCR SERPLBLD CKD-EPI 2021: 68 ML/MIN/1.73M2
GLUCOSE SERPL-MCNC: 102 MG/DL (ref 65–99)
HDLC SERPL-MCNC: 41 MG/DL
LDLC SERPL CALC-MCNC: 81 MG/DL (CALC)
MICROALBUMIN UR-MCNC: <0.2 MG/DL
NONHDLC SERPL-MCNC: 100 MG/DL (CALC)
POTASSIUM SERPL-SCNC: 4.5 MMOL/L (ref 3.5–5.3)
PROT SERPL-MCNC: 7 G/DL (ref 6.1–8.1)
SODIUM SERPL-SCNC: 138 MMOL/L (ref 135–146)
TRIGL SERPL-MCNC: 95 MG/DL

## 2025-07-01 DIAGNOSIS — E78.5 HYPERLIPIDEMIA, UNSPECIFIED HYPERLIPIDEMIA TYPE: ICD-10-CM

## 2025-07-03 PROCEDURE — RXMED WILLOW AMBULATORY MEDICATION CHARGE

## 2025-07-03 RX ORDER — ATORVASTATIN CALCIUM 40 MG/1
40 TABLET, FILM COATED ORAL DAILY
Qty: 90 TABLET | Refills: 0 | Status: SHIPPED | OUTPATIENT
Start: 2025-07-03

## 2025-07-05 ENCOUNTER — PHARMACY VISIT (OUTPATIENT)
Dept: PHARMACY | Facility: CLINIC | Age: 65
End: 2025-07-05
Payer: COMMERCIAL

## 2025-07-24 ENCOUNTER — APPOINTMENT (OUTPATIENT)
Dept: OPHTHALMOLOGY | Facility: CLINIC | Age: 65
End: 2025-07-24
Payer: COMMERCIAL

## 2025-07-24 ENCOUNTER — TELEMEDICINE (OUTPATIENT)
Dept: PHARMACY | Facility: HOSPITAL | Age: 65
End: 2025-07-24
Payer: COMMERCIAL

## 2025-07-24 DIAGNOSIS — E11.9 TYPE 2 DIABETES MELLITUS WITHOUT COMPLICATION, WITHOUT LONG-TERM CURRENT USE OF INSULIN: Primary | ICD-10-CM

## 2025-07-24 RX ORDER — BLOOD-GLUCOSE SENSOR
EACH MISCELLANEOUS
Qty: 2 EACH | Refills: 11 | Status: SHIPPED | OUTPATIENT
Start: 2025-07-24

## 2025-07-24 NOTE — PROGRESS NOTES
Clinical Pharmacy Appointment    Patient ID: Trevon Vyas is a 65 y.o. female who presents for Diabetes.    Pt is here for Follow Up appointment.     Referring Provider: Brianna Woosd DO  Last visit: 1/17/25  Next visit: Cris Angulo 8/21/25      Subjective       HPI  DIABETES MELLITUS TYPE 2:    Diagnosed with diabetes:  NA. Known diabetic complications: NA.  Does patient follow with Endocrinology: No  Last optometry exam: NA  Most recent visit in Podiatry: NA-- patient denies sores or cuts on feet today      Current diabetic medications include:  Mounjaro 5 mg/0.5 mL - once weekly  Jardiance 10 mg - 1 tablet daily     Clarifications to above regimen:  NA  Adverse Effects: NA    Past diabetic medications include:  Metformin- upset stomach  Glimepiride    Glucose Readings:  Glucometer/CGM Type: CGM - Carlita   Patient tests BG continuous times per day        Lifestyle:  Diet: 2 meals/day. Salads, fried fish, bread, green beans, baked potatoes  Physical Activity: 3x weekly (walks, goes to [facility], strength training)    Secondary Prevention:  Statin? Yes  ACE-I/ARB? Yes  Aspirin? Yes    Pertinent PMH Review:  PMH of Pancreatitis: No  PMH of Retinopathy: No  PMH of Urinary Tract Infections: No  PMH of MTC: No  UACR/EGFR in last year?: Yes      Objective   Allergies   Allergen Reactions    Sulfa (Sulfonamide Antibiotics) Rash    Sulfur Rash     Social History     Social History Narrative    Not on file        Vitals  BP Readings from Last 2 Encounters:   05/13/25 133/83   02/10/25 120/78     BMI Readings from Last 1 Encounters:   05/13/25 24.75 kg/m²      Labs  A1C  Lab Results   Component Value Date    HGBA1C 8.3 (H) 01/17/2025    HGBA1C 7.8 (H) 08/09/2024    HGBA1C 7.3 (H) 05/03/2024     BMP  Lab Results   Component Value Date    CALCIUM 9.7 06/23/2025     06/23/2025    K 4.5 06/23/2025    CO2 25 06/23/2025     06/23/2025    BUN 14 06/23/2025    CREATININE 0.93 06/23/2025    EGFR 68 06/23/2025     LFTs  Lab  Results   Component Value Date    ALT 20 06/23/2025    AST 33 06/23/2025    ALKPHOS 96 06/23/2025    BILITOT 0.6 06/23/2025     FLP  Lab Results   Component Value Date    TRIG 95 06/23/2025    CHOL 141 06/23/2025    LDLF 58 10/14/2022    LDLCALC 81 06/23/2025    HDL 41 (L) 06/23/2025     Urine Microalbumin  Lab Results   Component Value Date    MICROALBCREA NOTE 06/23/2025     Weight Management  Wt Readings from Last 3 Encounters:   05/13/25 71.7 kg (158 lb)   04/24/25 70.3 kg (155 lb)   03/31/25 73.5 kg (162 lb 0.6 oz)      There is no height or weight on file to calculate BMI.     Assessment/Plan   Problem List Items Addressed This Visit       Diabetes mellitus (Multi) - Primary    Patient's goal A1c is < 7%.  Is pt at goal? No, 8.3%  Patient's SMBGs are very well controlled.     Rationale for plan: BG within goal range, Patient does not want to lose any weight. Happy with current medications.     Medication Changes:  CONTINUE  Mounjaro 5 mg/0.5 mL - once weekly  Jardiance 10 mg - 1 tablet daily     Future Considerations:  Could increase dose of Jardiance if needed    Monitoring and Education:  Continue CGM (onelia 3 plus)            Relevant Orders    Hemoglobin A1c        Clinical Pharmacist follow-up:  3 months     Continue all meds under the continuation of care with the referring provider and clinical pharmacy team.    Thank you,  Leah Alvarez PharmD [facility]  Clinical Pharmacy Resident, Primary Care     Verbal consent to manage patient's drug therapy was obtained from the patient. They were informed they may decline to participate or withdraw from participation in pharmacy services at any time.

## 2025-07-24 NOTE — ASSESSMENT & PLAN NOTE
Patient's goal A1c is < 7%.  Is pt at goal? No, 8.3%  Patient's SMBGs are very well controlled.     Rationale for plan: BG within goal range, Patient does not want to lose any weight. Happy with current medications.     Medication Changes:  CONTINUE  Mounjaro 5 mg/0.5 mL - once weekly  Jardiance 10 mg - 1 tablet daily     Future Considerations:  Could increase dose of Jardiance if needed    Monitoring and Education:  Continue CGM (onelia 3 plus)

## 2025-07-31 PROCEDURE — RXMED WILLOW AMBULATORY MEDICATION CHARGE

## 2025-08-01 ENCOUNTER — PHARMACY VISIT (OUTPATIENT)
Dept: PHARMACY | Facility: CLINIC | Age: 65
End: 2025-08-01
Payer: COMMERCIAL

## 2025-08-08 ENCOUNTER — APPOINTMENT (OUTPATIENT)
Dept: PRIMARY CARE | Facility: CLINIC | Age: 65
End: 2025-08-08
Payer: COMMERCIAL

## 2025-08-14 ENCOUNTER — APPOINTMENT (OUTPATIENT)
Dept: OPHTHALMOLOGY | Facility: CLINIC | Age: 65
End: 2025-08-14
Payer: COMMERCIAL

## 2025-08-14 DIAGNOSIS — H40.1131 PRIMARY OPEN ANGLE GLAUCOMA (POAG) OF BOTH EYES, MILD STAGE: Primary | ICD-10-CM

## 2025-08-14 DIAGNOSIS — H25.13 AGE-RELATED NUCLEAR CATARACT OF BOTH EYES: ICD-10-CM

## 2025-08-14 PROCEDURE — G2211 COMPLEX E/M VISIT ADD ON: HCPCS | Performed by: OPHTHALMOLOGY

## 2025-08-14 PROCEDURE — 99213 OFFICE O/P EST LOW 20 MIN: CPT | Performed by: OPHTHALMOLOGY

## 2025-08-14 PROCEDURE — 92133 CPTRZD OPH DX IMG PST SGM ON: CPT | Performed by: OPHTHALMOLOGY

## 2025-08-14 ASSESSMENT — SLIT LAMP EXAM - LIDS
COMMENTS: GOOD POSITION
COMMENTS: GOOD POSITION

## 2025-08-14 ASSESSMENT — ENCOUNTER SYMPTOMS
PSYCHIATRIC NEGATIVE: 0
ENDOCRINE NEGATIVE: 0
CARDIOVASCULAR NEGATIVE: 0
ALLERGIC/IMMUNOLOGIC NEGATIVE: 0
RESPIRATORY NEGATIVE: 0
GASTROINTESTINAL NEGATIVE: 0
NEUROLOGICAL NEGATIVE: 0
MUSCULOSKELETAL NEGATIVE: 0
CONSTITUTIONAL NEGATIVE: 0
HEMATOLOGIC/LYMPHATIC NEGATIVE: 0
EYES NEGATIVE: 1

## 2025-08-14 ASSESSMENT — PACHYMETRY
OS_CT(UM): 515
OD_CT(UM): 519

## 2025-08-14 ASSESSMENT — CUP TO DISC RATIO
OS_RATIO: 0.75
OD_RATIO: 0.7

## 2025-08-14 ASSESSMENT — CONF VISUAL FIELD
OS_SUPERIOR_NASAL_RESTRICTION: 0
OS_INFERIOR_NASAL_RESTRICTION: 0
OS_SUPERIOR_TEMPORAL_RESTRICTION: 0
OD_SUPERIOR_TEMPORAL_RESTRICTION: 0
OS_INFERIOR_TEMPORAL_RESTRICTION: 0
OS_NORMAL: 1
OD_INFERIOR_NASAL_RESTRICTION: 0
OD_INFERIOR_TEMPORAL_RESTRICTION: 0
OD_SUPERIOR_NASAL_RESTRICTION: 0
OD_NORMAL: 1

## 2025-08-14 ASSESSMENT — EXTERNAL EXAM - LEFT EYE: OS_EXAM: NORMAL

## 2025-08-14 ASSESSMENT — VISUAL ACUITY
OD_CC+: -1
OS_CC+: -1
OS_CC: 20/25
CORRECTION_TYPE: GLASSES
METHOD: SNELLEN - LINEAR
OD_CC: 20/25

## 2025-08-14 ASSESSMENT — EXTERNAL EXAM - RIGHT EYE: OD_EXAM: NORMAL

## 2025-08-14 ASSESSMENT — TONOMETRY
OS_IOP_MMHG: 10
OD_IOP_MMHG: 12
IOP_METHOD: GOLDMANN APPLANATION

## 2025-08-15 LAB
EST. AVERAGE GLUCOSE BLD GHB EST-MCNC: 154 MG/DL
EST. AVERAGE GLUCOSE BLD GHB EST-SCNC: 8.5 MMOL/L
HBA1C MFR BLD: 7 %

## 2025-08-20 ENCOUNTER — APPOINTMENT (OUTPATIENT)
Dept: PRIMARY CARE | Facility: CLINIC | Age: 65
End: 2025-08-20
Payer: COMMERCIAL

## 2025-08-21 ENCOUNTER — APPOINTMENT (OUTPATIENT)
Dept: PRIMARY CARE | Facility: CLINIC | Age: 65
End: 2025-08-21
Payer: COMMERCIAL

## 2025-08-21 VITALS
HEIGHT: 68 IN | HEART RATE: 78 BPM | OXYGEN SATURATION: 98 % | SYSTOLIC BLOOD PRESSURE: 121 MMHG | WEIGHT: 152 LBS | DIASTOLIC BLOOD PRESSURE: 75 MMHG | BODY MASS INDEX: 23.04 KG/M2

## 2025-08-21 DIAGNOSIS — E78.5 HYPERLIPIDEMIA, UNSPECIFIED HYPERLIPIDEMIA TYPE: ICD-10-CM

## 2025-08-21 DIAGNOSIS — M85.80 OSTEOPENIA, UNSPECIFIED LOCATION: ICD-10-CM

## 2025-08-21 DIAGNOSIS — E11.9 TYPE 2 DIABETES MELLITUS WITHOUT COMPLICATION, WITHOUT LONG-TERM CURRENT USE OF INSULIN: ICD-10-CM

## 2025-08-21 DIAGNOSIS — H40.9 GLAUCOMA OF BOTH EYES, UNSPECIFIED GLAUCOMA TYPE: ICD-10-CM

## 2025-08-21 DIAGNOSIS — N95.2 VAGINAL ATROPHY: ICD-10-CM

## 2025-08-21 DIAGNOSIS — K21.9 GERD WITHOUT ESOPHAGITIS: ICD-10-CM

## 2025-08-21 DIAGNOSIS — K51.80 OTHER ULCERATIVE COLITIS WITHOUT COMPLICATION: ICD-10-CM

## 2025-08-21 DIAGNOSIS — I10 PRIMARY HYPERTENSION: ICD-10-CM

## 2025-08-21 DIAGNOSIS — Z76.89 ENCOUNTER TO ESTABLISH CARE: Primary | ICD-10-CM

## 2025-08-21 PROCEDURE — 1125F AMNT PAIN NOTED PAIN PRSNT: CPT

## 2025-08-21 PROCEDURE — G2211 COMPLEX E/M VISIT ADD ON: HCPCS

## 2025-08-21 PROCEDURE — 99214 OFFICE O/P EST MOD 30 MIN: CPT

## 2025-08-21 PROCEDURE — 4010F ACE/ARB THERAPY RXD/TAKEN: CPT

## 2025-08-21 PROCEDURE — 3078F DIAST BP <80 MM HG: CPT

## 2025-08-21 PROCEDURE — 3074F SYST BP LT 130 MM HG: CPT

## 2025-08-21 PROCEDURE — 1159F MED LIST DOCD IN RCRD: CPT

## 2025-08-21 PROCEDURE — 3008F BODY MASS INDEX DOCD: CPT

## 2025-08-21 RX ORDER — OMEPRAZOLE 40 MG/1
40 CAPSULE, DELAYED RELEASE ORAL DAILY
Qty: 90 CAPSULE | Refills: 1 | Status: SHIPPED | OUTPATIENT
Start: 2025-08-21

## 2025-08-21 RX ORDER — ATORVASTATIN CALCIUM 40 MG/1
40 TABLET, FILM COATED ORAL DAILY
Qty: 90 TABLET | Refills: 1 | Status: SHIPPED | OUTPATIENT
Start: 2025-08-21

## 2025-08-21 ASSESSMENT — ENCOUNTER SYMPTOMS
NERVOUS/ANXIOUS: 0
JOINT SWELLING: 0
COUGH: 0
DIZZINESS: 0
SHORTNESS OF BREATH: 0
CONFUSION: 0
FATIGUE: 0
ABDOMINAL PAIN: 0
PALPITATIONS: 0
DIFFICULTY URINATING: 0
CHILLS: 0
OCCASIONAL FEELINGS OF UNSTEADINESS: 0
LOSS OF SENSATION IN FEET: 0
POLYDIPSIA: 0
WHEEZING: 0
BACK PAIN: 0
UNEXPECTED WEIGHT CHANGE: 0
DEPRESSION: 0
CHEST TIGHTNESS: 0
NECK PAIN: 0

## 2025-08-21 ASSESSMENT — PATIENT HEALTH QUESTIONNAIRE - PHQ9
1. LITTLE INTEREST OR PLEASURE IN DOING THINGS: NOT AT ALL
SUM OF ALL RESPONSES TO PHQ9 QUESTIONS 1 AND 2: 0
2. FEELING DOWN, DEPRESSED OR HOPELESS: NOT AT ALL

## 2025-08-21 ASSESSMENT — PAIN SCALES - GENERAL: PAINLEVEL_OUTOF10: 6

## 2025-08-26 ASSESSMENT — ENCOUNTER SYMPTOMS
HEMATURIA: 0
SPEECH DIFFICULTY: 0
SINUS PRESSURE: 0
NAUSEA: 0
VOMITING: 0
SEIZURES: 0
WEAKNESS: 0
FLANK PAIN: 0
DYSURIA: 0
BLOOD IN STOOL: 0
LIGHT-HEADEDNESS: 0
NUMBNESS: 0
SINUS PAIN: 0
EYE ITCHING: 0
EYE REDNESS: 0
HEADACHES: 0
CONSTIPATION: 0
DIARRHEA: 0
EYE PAIN: 0

## 2025-09-03 DIAGNOSIS — R20.2 TINGLING OF BOTH FEET: Primary | ICD-10-CM

## 2025-09-05 LAB
ERYTHROCYTE [DISTWIDTH] IN BLOOD BY AUTOMATED COUNT: 14.3 % (ref 11–15)
FOLATE SERPL-MCNC: 17.5 NG/ML
HCT VFR BLD AUTO: 38.8 % (ref 35–45)
HGB BLD-MCNC: 11.8 G/DL (ref 11.7–15.5)
MCH RBC QN AUTO: 25.5 PG (ref 27–33)
MCHC RBC AUTO-ENTMCNC: 30.4 G/DL (ref 32–36)
MCV RBC AUTO: 84 FL (ref 80–100)
PLATELET # BLD AUTO: 267 THOUSAND/UL (ref 140–400)
PMV BLD REES-ECKER: 9.3 FL (ref 7.5–12.5)
RBC # BLD AUTO: 4.62 MILLION/UL (ref 3.8–5.1)
TSH SERPL-ACNC: 1.24 MIU/L (ref 0.4–4.5)
VIT B12 SERPL-MCNC: 611 PG/ML (ref 200–1100)
WBC # BLD AUTO: 5.9 THOUSAND/UL (ref 3.8–10.8)

## 2025-09-30 ENCOUNTER — APPOINTMENT (OUTPATIENT)
Dept: PRIMARY CARE | Facility: CLINIC | Age: 65
End: 2025-09-30
Payer: COMMERCIAL

## 2025-10-16 ENCOUNTER — APPOINTMENT (OUTPATIENT)
Dept: PHARMACY | Facility: HOSPITAL | Age: 65
End: 2025-10-16
Payer: COMMERCIAL

## 2026-01-15 ENCOUNTER — APPOINTMENT (OUTPATIENT)
Dept: ENDOCRINOLOGY | Facility: CLINIC | Age: 66
End: 2026-01-15
Payer: COMMERCIAL

## 2026-02-19 ENCOUNTER — APPOINTMENT (OUTPATIENT)
Dept: OPHTHALMOLOGY | Facility: CLINIC | Age: 66
End: 2026-02-19
Payer: COMMERCIAL

## 2026-02-24 ENCOUNTER — APPOINTMENT (OUTPATIENT)
Dept: PHARMACY | Facility: HOSPITAL | Age: 66
End: 2026-02-24
Payer: COMMERCIAL